# Patient Record
Sex: MALE | Race: BLACK OR AFRICAN AMERICAN | Employment: OTHER | URBAN - METROPOLITAN AREA
[De-identification: names, ages, dates, MRNs, and addresses within clinical notes are randomized per-mention and may not be internally consistent; named-entity substitution may affect disease eponyms.]

---

## 2017-01-06 PROBLEM — M17.12 PRIMARY OSTEOARTHRITIS OF LEFT KNEE: Status: ACTIVE | Noted: 2017-01-06

## 2017-12-28 ENCOUNTER — TELEPHONE (OUTPATIENT)
Dept: INTERNAL MEDICINE CLINIC | Facility: CLINIC | Age: 47
End: 2017-12-28

## 2017-12-28 NOTE — TELEPHONE ENCOUNTER
Patient calling with R side chest pain off/on, denies any SOB, jaw pain or arm pain (not cardiac) .  Per patient has hx of  Acid reflux but not on any medication and does not recall what he has taken in the past. Patient states \"feels like food is stuck @

## 2017-12-29 ENCOUNTER — HOSPITAL ENCOUNTER (OUTPATIENT)
Dept: GENERAL RADIOLOGY | Age: 47
Discharge: HOME | End: 2017-12-29
Attending: INTERNAL MEDICINE
Payer: COMMERCIAL

## 2017-12-29 ENCOUNTER — APPOINTMENT (OUTPATIENT)
Dept: LAB | Age: 47
End: 2017-12-29
Attending: INTERNAL MEDICINE
Payer: COMMERCIAL

## 2017-12-29 ENCOUNTER — OFFICE VISIT (OUTPATIENT)
Dept: INTERNAL MEDICINE CLINIC | Facility: CLINIC | Age: 47
End: 2017-12-29

## 2017-12-29 VITALS
RESPIRATION RATE: 16 BRPM | HEIGHT: 75 IN | HEART RATE: 100 BPM | WEIGHT: 204 LBS | DIASTOLIC BLOOD PRESSURE: 82 MMHG | SYSTOLIC BLOOD PRESSURE: 120 MMHG | BODY MASS INDEX: 25.36 KG/M2 | OXYGEN SATURATION: 98 % | TEMPERATURE: 98 F

## 2017-12-29 DIAGNOSIS — K22.2 ESOPHAGEAL STRICTURE: ICD-10-CM

## 2017-12-29 DIAGNOSIS — R07.9 CHEST PAIN, UNSPECIFIED TYPE: ICD-10-CM

## 2017-12-29 DIAGNOSIS — R07.9 CHEST PAIN, UNSPECIFIED TYPE: Primary | ICD-10-CM

## 2017-12-29 PROCEDURE — 36415 COLL VENOUS BLD VENIPUNCTURE: CPT | Performed by: INTERNAL MEDICINE

## 2017-12-29 PROCEDURE — 83690 ASSAY OF LIPASE: CPT | Performed by: INTERNAL MEDICINE

## 2017-12-29 PROCEDURE — 99214 OFFICE O/P EST MOD 30 MIN: CPT | Performed by: INTERNAL MEDICINE

## 2017-12-29 PROCEDURE — 71020 XR CHEST PA + LAT CHEST (CPT=71020): CPT | Performed by: INTERNAL MEDICINE

## 2017-12-29 PROCEDURE — 85025 COMPLETE CBC W/AUTO DIFF WBC: CPT | Performed by: INTERNAL MEDICINE

## 2017-12-29 PROCEDURE — 93000 ELECTROCARDIOGRAM COMPLETE: CPT | Performed by: INTERNAL MEDICINE

## 2017-12-29 PROCEDURE — 81003 URINALYSIS AUTO W/O SCOPE: CPT | Performed by: INTERNAL MEDICINE

## 2017-12-29 PROCEDURE — 80053 COMPREHEN METABOLIC PANEL: CPT | Performed by: INTERNAL MEDICINE

## 2017-12-29 RX ORDER — PANTOPRAZOLE SODIUM 40 MG/1
40 TABLET, DELAYED RELEASE ORAL
Qty: 30 TABLET | Refills: 11 | Status: SHIPPED | OUTPATIENT
Start: 2017-12-29 | End: 2018-09-10

## 2017-12-29 NOTE — TELEPHONE ENCOUNTER
Cannot really give a definite answer as to the cause of his symptoms he could try over-the-counter medicines if he so desires may be omeprazole 20 mg

## 2017-12-29 NOTE — PROGRESS NOTES
Greyson Lam   50year old male. Patient presents with:  Chest Pain      HPI:     Chest Pain:   The patient is complaining of chest pain , no symptoms presently . The chest pain began 6-8 weeks ago  The chest pain is described as sharp . swallowing none. Weight loss none. EXAM:   /82   Pulse 100   Temp 98.3 °F (36.8 °C) (Oral)   Resp 16   Ht 75\"   Wt 204 lb   SpO2 98%   BMI 25.50 kg/m²     Problem focused exam:   General Appearance: alert and oriented , in no acute distress .

## 2018-01-23 ENCOUNTER — OFFICE VISIT (OUTPATIENT)
Dept: INTERNAL MEDICINE CLINIC | Facility: CLINIC | Age: 48
End: 2018-01-23

## 2018-01-23 VITALS
HEIGHT: 74.5 IN | BODY MASS INDEX: 25.9 KG/M2 | OXYGEN SATURATION: 96 % | WEIGHT: 204 LBS | DIASTOLIC BLOOD PRESSURE: 70 MMHG | RESPIRATION RATE: 16 BRPM | HEART RATE: 90 BPM | TEMPERATURE: 99 F | SYSTOLIC BLOOD PRESSURE: 110 MMHG

## 2018-01-23 DIAGNOSIS — Z00.00 ROUTINE GENERAL MEDICAL EXAMINATION AT A HEALTH CARE FACILITY: Primary | ICD-10-CM

## 2018-01-23 PROCEDURE — 99396 PREV VISIT EST AGE 40-64: CPT | Performed by: INTERNAL MEDICINE

## 2018-01-23 NOTE — PROGRESS NOTES
Jerrica Horne  1970 is a 46year old male.   Patient presents with:  Physical      HPI:   No new cc    Current Outpatient Prescriptions:  Pantoprazole Sodium 40 MG Oral Tab EC Take 1 tablet (40 mg total) by mouth every morning before breakfast. Ambreen Mckeon with erection, penile discharge, testicular pain or swelling, urgency/frequency.    Genitourinary:   Patient denies blood in urine, burning on urination, difficulty urinating, dysuria, urinary frequency , urinary incontinence/no history of kidney disease or discharge, no penile lesions. Testicles: unremarkable, normal-size, without masses, non tender. EXTREMITIES:   Clubbing: none. Cyanosis: absent . Edema: none. Pulses: present.    Tremors: no.   Varicose veins: spider veins  MUSCULOSKELETAL:   Cerv

## 2018-04-26 ENCOUNTER — TELEPHONE (OUTPATIENT)
Dept: INTERNAL MEDICINE CLINIC | Facility: CLINIC | Age: 48
End: 2018-04-26

## 2018-04-26 DIAGNOSIS — E78.00 ELEVATED LDL CHOLESTEROL LEVEL: Primary | ICD-10-CM

## 2018-04-26 DIAGNOSIS — R73.09 ELEVATED HEMOGLOBIN A1C: ICD-10-CM

## 2018-04-26 NOTE — TELEPHONE ENCOUNTER
----- Message from Smiley Mims MD sent at 4/19/2018 12:14 PM CDT -----  Reviewed results   LDL is borderline high. Sugars are drifting higher.   For his diet and exercise -recheck lipid and hemoglobin A1c in 4 months

## 2018-05-31 PROCEDURE — 88305 TISSUE EXAM BY PATHOLOGIST: CPT | Performed by: INTERNAL MEDICINE

## 2018-08-31 ENCOUNTER — TELEPHONE (OUTPATIENT)
Dept: INTERNAL MEDICINE CLINIC | Facility: CLINIC | Age: 48
End: 2018-08-31

## 2018-08-31 NOTE — TELEPHONE ENCOUNTER
Received a call from [facility] with [facility] who also had pt on the line. Pt had previously called our office requesting a SDA with Dr. Kevon Vanegas. None available d/t Dr. Kevon Vanegas being out of the office today.  Pt stated he called [employer] to see where he could go

## 2018-09-01 ENCOUNTER — APPOINTMENT (OUTPATIENT)
Dept: GENERAL RADIOLOGY | Facility: HOSPITAL | Age: 48
End: 2018-09-01
Payer: COMMERCIAL

## 2018-09-01 ENCOUNTER — HOSPITAL ENCOUNTER (EMERGENCY)
Facility: HOSPITAL | Age: 48
Discharge: HOME | End: 2018-09-02
Payer: COMMERCIAL

## 2018-09-01 DIAGNOSIS — R07.89 CHEST PAIN, ATYPICAL: Primary | ICD-10-CM

## 2018-09-01 LAB
ALBUMIN SERPL-MCNC: 3.4 G/DL (ref 3.5–4.8)
ALBUMIN/GLOB SERPL: 0.8 {RATIO} (ref 1–2)
ALP LIVER SERPL-CCNC: 69 U/L (ref 45–117)
ALT SERPL-CCNC: 25 U/L (ref 17–63)
ANION GAP SERPL CALC-SCNC: 5 MMOL/L (ref 0–18)
APTT PPP: 26.2 SECONDS (ref 26.1–34.6)
AST SERPL-CCNC: 13 U/L (ref 15–41)
BASOPHILS # BLD AUTO: 0.05 X10(3) UL (ref 0–0.1)
BASOPHILS NFR BLD AUTO: 0.8 %
BILIRUB SERPL-MCNC: 0.3 MG/DL (ref 0.1–2)
BUN BLD-MCNC: 15 MG/DL (ref 8–20)
BUN/CREAT SERPL: 13.6 (ref 10–20)
CALCIUM BLD-MCNC: 8.5 MG/DL (ref 8.3–10.3)
CHLORIDE SERPL-SCNC: 106 MMOL/L (ref 101–111)
CO2 SERPL-SCNC: 29 MMOL/L (ref 22–32)
CREAT BLD-MCNC: 1.1 MG/DL (ref 0.7–1.3)
D-DIMER: <0.27 UG/ML FEU (ref 0–0.49)
EOSINOPHIL # BLD AUTO: 0.34 X10(3) UL (ref 0–0.3)
EOSINOPHIL NFR BLD AUTO: 5.7 %
ERYTHROCYTE [DISTWIDTH] IN BLOOD BY AUTOMATED COUNT: 13.6 % (ref 11.5–16)
GLOBULIN PLAS-MCNC: 4.1 G/DL (ref 2.5–4)
GLUCOSE BLD-MCNC: 111 MG/DL (ref 70–99)
HCT VFR BLD AUTO: 41.8 % (ref 37–53)
HGB BLD-MCNC: 13.8 G/DL (ref 13–17)
IMMATURE GRANULOCYTE COUNT: 0.01 X10(3) UL (ref 0–1)
IMMATURE GRANULOCYTE RATIO %: 0.2 %
INR BLD: 0.98 (ref 0.9–1.1)
LIPASE: 138 U/L (ref 73–393)
LYMPHOCYTES # BLD AUTO: 2.46 X10(3) UL (ref 0.9–4)
LYMPHOCYTES NFR BLD AUTO: 41.1 %
M PROTEIN MFR SERPL ELPH: 7.5 G/DL (ref 6.1–8.3)
MCH RBC QN AUTO: 30.8 PG (ref 27–33.2)
MCHC RBC AUTO-ENTMCNC: 33 G/DL (ref 31–37)
MCV RBC AUTO: 93.3 FL (ref 80–99)
MONOCYTES # BLD AUTO: 0.44 X10(3) UL (ref 0.1–1)
MONOCYTES NFR BLD AUTO: 7.4 %
NEUTROPHIL ABS PRELIM: 2.68 X10 (3) UL (ref 1.3–6.7)
NEUTROPHILS # BLD AUTO: 2.68 X10(3) UL (ref 1.3–6.7)
NEUTROPHILS NFR BLD AUTO: 44.8 %
OSMOLALITY SERPL CALC.SUM OF ELEC: 292 MOSM/KG (ref 275–295)
PLATELET # BLD AUTO: 176 10(3)UL (ref 150–450)
POTASSIUM SERPL-SCNC: 3.9 MMOL/L (ref 3.6–5.1)
PSA SERPL DL<=0.01 NG/ML-MCNC: 13.4 SECONDS (ref 12.4–14.7)
RBC # BLD AUTO: 4.48 X10(6)UL (ref 4.3–5.7)
RED CELL DISTRIBUTION WIDTH-SD: 46.3 FL (ref 35.1–46.3)
SODIUM SERPL-SCNC: 140 MMOL/L (ref 136–144)
TROPONIN I SERPL-MCNC: <0.046 NG/ML (ref ?–0.05)
WBC # BLD AUTO: 6 X10(3) UL (ref 4–13)

## 2018-09-01 PROCEDURE — 93005 ELECTROCARDIOGRAM TRACING: CPT

## 2018-09-01 PROCEDURE — 99285 EMERGENCY DEPT VISIT HI MDM: CPT

## 2018-09-01 PROCEDURE — 93010 ELECTROCARDIOGRAM REPORT: CPT

## 2018-09-01 PROCEDURE — 71045 X-RAY EXAM CHEST 1 VIEW: CPT

## 2018-09-01 PROCEDURE — 85730 THROMBOPLASTIN TIME PARTIAL: CPT

## 2018-09-01 PROCEDURE — 85610 PROTHROMBIN TIME: CPT

## 2018-09-01 PROCEDURE — 83690 ASSAY OF LIPASE: CPT

## 2018-09-01 PROCEDURE — 85025 COMPLETE CBC W/AUTO DIFF WBC: CPT

## 2018-09-01 PROCEDURE — 96374 THER/PROPH/DIAG INJ IV PUSH: CPT

## 2018-09-01 PROCEDURE — 80053 COMPREHEN METABOLIC PANEL: CPT

## 2018-09-01 PROCEDURE — 84484 ASSAY OF TROPONIN QUANT: CPT

## 2018-09-01 PROCEDURE — 85378 FIBRIN DEGRADE SEMIQUANT: CPT

## 2018-09-01 RX ORDER — MAGNESIUM HYDROXIDE/ALUMINUM HYDROXICE/SIMETHICONE 120; 1200; 1200 MG/30ML; MG/30ML; MG/30ML
30 SUSPENSION ORAL ONCE
Status: COMPLETED | OUTPATIENT
Start: 2018-09-01 | End: 2018-09-01

## 2018-09-01 RX ORDER — KETOROLAC TROMETHAMINE 30 MG/ML
10 INJECTION, SOLUTION INTRAMUSCULAR; INTRAVENOUS ONCE
Status: COMPLETED | OUTPATIENT
Start: 2018-09-01 | End: 2018-09-01

## 2018-09-02 VITALS
HEART RATE: 63 BPM | SYSTOLIC BLOOD PRESSURE: 123 MMHG | HEIGHT: 76 IN | OXYGEN SATURATION: 100 % | WEIGHT: 200 LBS | DIASTOLIC BLOOD PRESSURE: 76 MMHG | BODY MASS INDEX: 24.36 KG/M2 | RESPIRATION RATE: 16 BRPM

## 2018-09-02 LAB — TROPONIN I SERPL-MCNC: <0.046 NG/ML (ref ?–0.05)

## 2018-09-02 PROCEDURE — 84484 ASSAY OF TROPONIN QUANT: CPT

## 2018-09-02 NOTE — ED PROVIDER NOTES
Patient Seen in: [facility] Emergency Department    History   Patient presents with:  Chest Pain Angina (cardiovascular)    Stated Complaint: chest pain    HPI    This pleasant 76-year-old male presents to the ER with the complaint that for weeks he 119/82  Pulse: 75  Resp: 18  Temp: n/a  Temp src: n/a  SpO2: 96 %  O2 Device: None (Room air)    Current:/69   Pulse 62   Resp 12   Ht 193 cm (6' 4\")   Wt 90.7 kg   SpO2 97%   BMI 24.34 kg/m²         Physical Exam    GENERAL APPEARANCE:     Well dev Narrative: The following orders were created for panel order CBC WITH DIFFERENTIAL WITH PLATELET.   Procedure                               Abnormality         Status                     ---------                               -----------         --- primary care physician for reexamination further outpatient workup. I gave him a copy of the results. His wife is driving home. MDM       Patient was screened and evaluated during this visit.    As a treating physician attending to the patient, I deter

## 2018-09-03 LAB
ATRIAL RATE: 65 BPM
ATRIAL RATE: 70 BPM
P AXIS: 55 DEGREES
P AXIS: 70 DEGREES
P-R INTERVAL: 222 MS
P-R INTERVAL: 234 MS
Q-T INTERVAL: 356 MS
Q-T INTERVAL: 374 MS
QRS DURATION: 82 MS
QRS DURATION: 82 MS
QTC CALCULATION (BEZET): 384 MS
QTC CALCULATION (BEZET): 388 MS
R AXIS: 24 DEGREES
R AXIS: 26 DEGREES
T AXIS: 14 DEGREES
T AXIS: 6 DEGREES
VENTRICULAR RATE: 65 BPM
VENTRICULAR RATE: 70 BPM

## 2018-09-10 ENCOUNTER — OFFICE VISIT (OUTPATIENT)
Dept: INTERNAL MEDICINE CLINIC | Facility: CLINIC | Age: 48
End: 2018-09-10

## 2018-09-10 VITALS
DIASTOLIC BLOOD PRESSURE: 70 MMHG | RESPIRATION RATE: 12 BRPM | SYSTOLIC BLOOD PRESSURE: 110 MMHG | OXYGEN SATURATION: 97 % | BODY MASS INDEX: 24 KG/M2 | WEIGHT: 198.75 LBS | TEMPERATURE: 98 F | HEART RATE: 84 BPM

## 2018-09-10 DIAGNOSIS — R07.89 OTHER CHEST PAIN: Primary | ICD-10-CM

## 2018-09-10 PROCEDURE — 99214 OFFICE O/P EST MOD 30 MIN: CPT | Performed by: INTERNAL MEDICINE

## 2018-09-10 NOTE — PATIENT INSTRUCTIONS
Pt concerned about the heart. Will proceed with tests as outlined. Patient informed this could be related to the esophageal stricture.   May go to ER if symptoms signs worsen

## 2018-09-10 NOTE — PROGRESS NOTES
Isac Lynn   46year old male. No chief complaint on file. HPI:     Chest Pain:    Follow-up chest pain. Was in the ER. Recently. Has had no other chest pain at this moment. No GI symptoms.     No current outpatient medications on lymphadenopathy . Chest: normal shape and expansion with respiration . Heart: normal rate , regular rhythm , no murmurs . Lungs: respiratory rate is normal, but breath sounds are clear bilaterally .    Extremities: no clubbing, cyanosis or edema , per

## 2018-09-19 ENCOUNTER — HOSPITAL ENCOUNTER (OUTPATIENT)
Dept: CV DIAGNOSTICS | Facility: HOSPITAL | Age: 48
Discharge: HOME | End: 2018-09-19
Attending: INTERNAL MEDICINE
Payer: COMMERCIAL

## 2018-09-19 DIAGNOSIS — R07.89 OTHER CHEST PAIN: ICD-10-CM

## 2018-09-19 PROCEDURE — 93017 CV STRESS TEST TRACING ONLY: CPT | Performed by: INTERNAL MEDICINE

## 2018-09-19 PROCEDURE — 93018 CV STRESS TEST I&R ONLY: CPT | Performed by: INTERNAL MEDICINE

## 2018-09-24 ENCOUNTER — HOSPITAL ENCOUNTER (OUTPATIENT)
Dept: CT IMAGING | Facility: HOSPITAL | Age: 48
Discharge: HOME | End: 2018-09-24
Attending: INTERNAL MEDICINE

## 2018-09-24 DIAGNOSIS — Z13.6 SCREENING FOR CARDIOVASCULAR CONDITION: ICD-10-CM

## 2018-09-24 DIAGNOSIS — R07.89 OTHER CHEST PAIN: ICD-10-CM

## 2018-10-01 ENCOUNTER — OFFICE VISIT (OUTPATIENT)
Dept: INTERNAL MEDICINE CLINIC | Facility: CLINIC | Age: 48
End: 2018-10-01

## 2018-10-01 VITALS
TEMPERATURE: 98 F | HEIGHT: 75 IN | BODY MASS INDEX: 24.93 KG/M2 | WEIGHT: 200.5 LBS | OXYGEN SATURATION: 97 % | HEART RATE: 88 BPM | RESPIRATION RATE: 18 BRPM | DIASTOLIC BLOOD PRESSURE: 60 MMHG | SYSTOLIC BLOOD PRESSURE: 100 MMHG

## 2018-10-01 DIAGNOSIS — L03.115 CELLULITIS OF RIGHT LEG: Primary | ICD-10-CM

## 2018-10-01 PROCEDURE — 99213 OFFICE O/P EST LOW 20 MIN: CPT | Performed by: INTERNAL MEDICINE

## 2018-10-01 RX ORDER — TRIAMCINOLONE ACETONIDE 0.25 MG/G
1 CREAM TOPICAL 2 TIMES DAILY
Qty: 80 G | Refills: 0 | Status: SHIPPED | OUTPATIENT
Start: 2018-10-01 | End: 2019-05-13 | Stop reason: ALTCHOICE

## 2018-10-01 RX ORDER — SULFAMETHOXAZOLE AND TRIMETHOPRIM 800; 160 MG/1; MG/1
1 TABLET ORAL 2 TIMES DAILY
Qty: 20 TABLET | Refills: 0 | Status: SHIPPED | OUTPATIENT
Start: 2018-10-01 | End: 2018-10-11

## 2018-10-01 NOTE — PROGRESS NOTES
Mark Collazo is a 46year old male. HPI:   Patient presents with: Insect Bite: Right upper leg. Patient presents with acute dermatological complaint. First noticed itching last week.   He thought it was his new underwear at first.  However, over th (36.6 °C) (Oral)   Resp 18   Ht 75\"   Wt 200 lb 8 oz   SpO2 97%   BMI 25.06 kg/m²   GENERAL: Alert and oriented, well developed, well nourished,in no apparent distress  SKIN: 3 maculopapular lesions right lower thigh, tender to palpation  HEENT: atraumati

## 2018-10-01 NOTE — PATIENT INSTRUCTIONS
- Start antibiotic (Bactrim). Take 1 tablet twice daily with food for next 10 days. - Use steroid cream (triamcinolone) twice daily on red spots  - Keep an eye on your upper leg/thigh pain.   This may be from the infection spreading.    - Follow up in 1

## 2018-10-03 ENCOUNTER — TELEPHONE (OUTPATIENT)
Dept: INTERNAL MEDICINE CLINIC | Facility: CLINIC | Age: 48
End: 2018-10-03

## 2018-10-03 DIAGNOSIS — M79.604 RIGHT LEG PAIN: Primary | ICD-10-CM

## 2018-10-03 DIAGNOSIS — L53.9 ERYTHEMA OF SKIN: ICD-10-CM

## 2018-10-03 NOTE — TELEPHONE ENCOUNTER
Patient calling in, sated he had seen Dr Chasidy Bautista on 10/01/18 and his leg is still feeling uncomfortable. Symptoms of tenderness. Pt wondering if she should schedule another appointment to come in or have additional testing complete.

## 2018-10-03 NOTE — TELEPHONE ENCOUNTER
We can check an ultrasound of the leg (MSK, not doppler). Order in system. He can call central scheduling [phone] to schedule. If ultrasound is abnormal or symptoms persist can follow up with Dr. Warren Vickers in the office.

## 2018-10-03 NOTE — TELEPHONE ENCOUNTER
Pt with slight improvement of right leg. Leg remains red, warm to touch and tender from right knee to right hip. Please advise.

## 2018-10-09 ENCOUNTER — HOSPITAL ENCOUNTER (OUTPATIENT)
Dept: ULTRASOUND IMAGING | Facility: HOSPITAL | Age: 48
Discharge: HOME | End: 2018-10-09
Attending: INTERNAL MEDICINE
Payer: COMMERCIAL

## 2018-10-09 ENCOUNTER — HOSPITAL ENCOUNTER (OUTPATIENT)
Dept: CARDIOLOGY CLINIC | Facility: HOSPITAL | Age: 48
Discharge: HOME | End: 2018-10-09
Attending: INTERNAL MEDICINE

## 2018-10-09 DIAGNOSIS — M79.604 RIGHT LEG PAIN: ICD-10-CM

## 2018-10-09 DIAGNOSIS — Z13.9 ENCOUNTER FOR SCREENING: ICD-10-CM

## 2018-10-09 DIAGNOSIS — L53.9 ERYTHEMA OF SKIN: ICD-10-CM

## 2018-10-09 PROCEDURE — 76882 US LMTD JT/FCL EVL NVASC XTR: CPT | Performed by: INTERNAL MEDICINE

## 2018-10-23 ENCOUNTER — TELEPHONE (OUTPATIENT)
Dept: INTERNAL MEDICINE CLINIC | Facility: CLINIC | Age: 48
End: 2018-10-23

## 2018-10-23 DIAGNOSIS — I77.9 ARTERIAL DISEASE (HCC): Primary | ICD-10-CM

## 2018-10-23 NOTE — TELEPHONE ENCOUNTER
Notes recorded by Mei Vazquez MD on 10/11/2018 at 9:49 AM CDT  Reviewed results   Please proceed with carotid ultrasound- diagnosis abnormal arterial disease  See me after that    Pt notified of results and provider instructions.   Pt verbalized underst

## 2018-11-12 ENCOUNTER — HOSPITAL ENCOUNTER (OUTPATIENT)
Dept: ULTRASOUND IMAGING | Age: 48
Discharge: HOME | End: 2018-11-12
Attending: INTERNAL MEDICINE
Payer: COMMERCIAL

## 2018-11-12 DIAGNOSIS — I77.9 ARTERIAL DISEASE (HCC): ICD-10-CM

## 2018-11-12 PROCEDURE — 93880 EXTRACRANIAL BILAT STUDY: CPT | Performed by: INTERNAL MEDICINE

## 2019-05-13 ENCOUNTER — HOSPITAL ENCOUNTER (OUTPATIENT)
Dept: GENERAL RADIOLOGY | Age: 49
Discharge: HOME | End: 2019-05-13
Attending: INTERNAL MEDICINE
Payer: COMMERCIAL

## 2019-05-13 ENCOUNTER — OFFICE VISIT (OUTPATIENT)
Dept: INTERNAL MEDICINE CLINIC | Facility: CLINIC | Age: 49
End: 2019-05-13
Payer: COMMERCIAL

## 2019-05-13 VITALS
SYSTOLIC BLOOD PRESSURE: 100 MMHG | WEIGHT: 203 LBS | RESPIRATION RATE: 16 BRPM | HEIGHT: 74.5 IN | BODY MASS INDEX: 25.78 KG/M2 | DIASTOLIC BLOOD PRESSURE: 68 MMHG | HEART RATE: 88 BPM | TEMPERATURE: 99 F

## 2019-05-13 DIAGNOSIS — R05.3 PERSISTENT COUGH: ICD-10-CM

## 2019-05-13 DIAGNOSIS — K22.2 ESOPHAGEAL STRICTURE: Primary | ICD-10-CM

## 2019-05-13 DIAGNOSIS — K22.2 ESOPHAGEAL STRICTURE: ICD-10-CM

## 2019-05-13 DIAGNOSIS — Z00.00 PREVENTATIVE HEALTH CARE: ICD-10-CM

## 2019-05-13 DIAGNOSIS — R07.89 ATYPICAL CHEST PAIN: ICD-10-CM

## 2019-05-13 DIAGNOSIS — R73.03 PREDIABETES: ICD-10-CM

## 2019-05-13 PROCEDURE — 99214 OFFICE O/P EST MOD 30 MIN: CPT | Performed by: INTERNAL MEDICINE

## 2019-05-13 PROCEDURE — 71046 X-RAY EXAM CHEST 2 VIEWS: CPT | Performed by: INTERNAL MEDICINE

## 2019-05-13 NOTE — PATIENT INSTRUCTIONS
- We will check a chest x-ray today  - Schedule appointment with GI specialist (Dr. Patrizia Lazar)  - We will check your general screening blood tests. Get them done at [facility]. It was a pleasure seeing you in the clinic today.   Thank you for choosing the

## 2019-05-13 NOTE — PROGRESS NOTES
Javy Hubbard is a 44year old male. HPI:   Patient presents with:  Cough  Chest Pain    Patient presents with several acute complaints. He has been dealing with persistent cough, chest discomfort/pain.   This has been an intermittent issue, going on s lb  01/29/18 : 206 lb  01/23/18 : 204 lb    EXAM:   /68 (BP Location: Left arm, Patient Position: Sitting, Cuff Size: adult)   Pulse 88   Temp 98.6 °F (37 °C) (Oral)   Resp 16   Ht 74.5\"   Wt 203 lb   BMI 25.71 kg/m²   GENERAL: Alert and oriented, w understanding of these issues and agrees to the plan. The patient is asked to return to clinic in 1-2 months with Dr. Mary Tripp MD for follow up on chronic issues, or earlier if acute issues arise.     Kali Umanzor MD

## 2019-05-21 DIAGNOSIS — R73.03 PREDIABETES: Primary | ICD-10-CM

## 2019-05-21 DIAGNOSIS — R73.09 ELEVATED HEMOGLOBIN A1C: ICD-10-CM

## 2019-05-22 PROBLEM — K20.0 EOSINOPHILIC ESOPHAGITIS: Status: ACTIVE | Noted: 2019-05-22

## 2019-06-07 ENCOUNTER — HOSPITAL ENCOUNTER (OUTPATIENT)
Dept: GENERAL RADIOLOGY | Facility: HOSPITAL | Age: 49
Discharge: HOME | End: 2019-06-07
Attending: INTERNAL MEDICINE
Payer: COMMERCIAL

## 2019-06-07 DIAGNOSIS — R13.19 ESOPHAGEAL DYSPHAGIA: ICD-10-CM

## 2019-06-07 DIAGNOSIS — R07.9 CHEST PAIN, UNSPECIFIED TYPE: ICD-10-CM

## 2019-06-07 DIAGNOSIS — K20.0 EOSINOPHILIC ESOPHAGITIS: ICD-10-CM

## 2019-06-07 PROCEDURE — 74220 X-RAY XM ESOPHAGUS 1CNTRST: CPT | Performed by: INTERNAL MEDICINE

## 2019-06-20 ENCOUNTER — HOSPITAL ENCOUNTER (EMERGENCY)
Facility: HOSPITAL | Age: 49
Discharge: HOME | End: 2019-06-20
Attending: EMERGENCY MEDICINE
Payer: COMMERCIAL

## 2019-06-20 ENCOUNTER — TELEPHONE (OUTPATIENT)
Dept: INTERNAL MEDICINE CLINIC | Facility: CLINIC | Age: 49
End: 2019-06-20

## 2019-06-20 ENCOUNTER — APPOINTMENT (OUTPATIENT)
Dept: GENERAL RADIOLOGY | Facility: HOSPITAL | Age: 49
End: 2019-06-20
Attending: EMERGENCY MEDICINE
Payer: COMMERCIAL

## 2019-06-20 VITALS
RESPIRATION RATE: 15 BRPM | SYSTOLIC BLOOD PRESSURE: 103 MMHG | BODY MASS INDEX: 24.87 KG/M2 | OXYGEN SATURATION: 98 % | WEIGHT: 200 LBS | HEART RATE: 67 BPM | HEIGHT: 75 IN | TEMPERATURE: 97 F | DIASTOLIC BLOOD PRESSURE: 67 MMHG

## 2019-06-20 DIAGNOSIS — R07.89 CHEST PAIN, MUSCULOSKELETAL: Primary | ICD-10-CM

## 2019-06-20 PROCEDURE — 99285 EMERGENCY DEPT VISIT HI MDM: CPT

## 2019-06-20 PROCEDURE — 96374 THER/PROPH/DIAG INJ IV PUSH: CPT

## 2019-06-20 PROCEDURE — 93010 ELECTROCARDIOGRAM REPORT: CPT

## 2019-06-20 PROCEDURE — 80053 COMPREHEN METABOLIC PANEL: CPT | Performed by: EMERGENCY MEDICINE

## 2019-06-20 PROCEDURE — 84484 ASSAY OF TROPONIN QUANT: CPT | Performed by: EMERGENCY MEDICINE

## 2019-06-20 PROCEDURE — 71046 X-RAY EXAM CHEST 2 VIEWS: CPT | Performed by: EMERGENCY MEDICINE

## 2019-06-20 PROCEDURE — 85025 COMPLETE CBC W/AUTO DIFF WBC: CPT | Performed by: EMERGENCY MEDICINE

## 2019-06-20 PROCEDURE — 93005 ELECTROCARDIOGRAM TRACING: CPT

## 2019-06-20 RX ORDER — KETOROLAC TROMETHAMINE 30 MG/ML
30 INJECTION, SOLUTION INTRAMUSCULAR; INTRAVENOUS ONCE
Status: COMPLETED | OUTPATIENT
Start: 2019-06-20 | End: 2019-06-20

## 2019-06-20 NOTE — ED PROVIDER NOTES
Patient Seen in: [facility] Emergency Department    History   Patient presents with:  Chest Pain Angina (cardiovascular)    Stated Complaint: chest pain    HPI    68-year-old male presents with 1 week of chest pain.   He reports 5 out of 10 sharp aidan clear   Heart: regular rate rhythm and no murmur. Abdomen: Soft and nontender. No abdominal masses. No peritoneal signs   Extremities: no edema, normal peripheral pulses  MSK:There is tenderness on palpation of the right upper pectoral region.   There by Technologist)  Patient has right sided and central chest pain that radiates posterior and a cough for a couple weeks. FINDINGS:  LUNGS:  No focal consolidation. Normal vascularity. CARDIAC:  Normal size cardiac silhouette.  MEDIASTINUM:  Normal. PLEU coughing, bending and twisting. It is reproducible on palpation. He has no shortness of breath or palpitations. EKG is unremarkable. Labs and chest x-ray ordered. Toradol ordered for pain. No acute findings on laboratory evaluation or chest x-ray.

## 2019-06-20 NOTE — TELEPHONE ENCOUNTER
Pt stated the past 3-4 days he has been experiencing chest pain. Pt reports no shortness of breath but feels like he is breathing differently. Pt denies headache, vision changes, nausea/vomiting, difficulty swallowing.  Pt does state to have some upper back

## 2019-06-20 NOTE — ED INITIAL ASSESSMENT (HPI)
Patient arrives from home with c/o chest pain states pain started Thursday last week describes as continuous and sharp to right upper chest with radiation into back.

## 2019-06-26 ENCOUNTER — TELEPHONE (OUTPATIENT)
Dept: INTERNAL MEDICINE CLINIC | Facility: CLINIC | Age: 49
End: 2019-06-26

## 2019-06-26 ENCOUNTER — OFFICE VISIT (OUTPATIENT)
Dept: INTERNAL MEDICINE CLINIC | Facility: CLINIC | Age: 49
End: 2019-06-26
Payer: COMMERCIAL

## 2019-06-26 VITALS
HEART RATE: 80 BPM | HEIGHT: 75 IN | OXYGEN SATURATION: 98 % | BODY MASS INDEX: 24.8 KG/M2 | DIASTOLIC BLOOD PRESSURE: 70 MMHG | WEIGHT: 199.5 LBS | TEMPERATURE: 98 F | SYSTOLIC BLOOD PRESSURE: 104 MMHG | RESPIRATION RATE: 16 BRPM

## 2019-06-26 DIAGNOSIS — R07.89 RIGHT-SIDED CHEST WALL PAIN: Primary | ICD-10-CM

## 2019-06-26 DIAGNOSIS — M54.6 ACUTE RIGHT-SIDED THORACIC BACK PAIN: ICD-10-CM

## 2019-06-26 PROCEDURE — 99214 OFFICE O/P EST MOD 30 MIN: CPT | Performed by: PHYSICIAN ASSISTANT

## 2019-06-26 RX ORDER — CYCLOBENZAPRINE HCL 10 MG
10 TABLET ORAL NIGHTLY PRN
Qty: 10 TABLET | Refills: 0 | Status: SHIPPED | OUTPATIENT
Start: 2019-06-26 | End: 2020-09-24 | Stop reason: ALTCHOICE

## 2019-06-26 RX ORDER — MELOXICAM 15 MG/1
15 TABLET ORAL DAILY
Qty: 15 TABLET | Refills: 0 | Status: SHIPPED | OUTPATIENT
Start: 2019-06-26 | End: 2020-09-24

## 2019-06-26 NOTE — PROGRESS NOTES
Isac Lynn is a 44year old male. HPI:   Patient presents for f/u from recent ED visit. Seen for R sided chest pain which began about three weeks ago. Describes as a constant sharp/stabbing pain for the past few weeks.   R side of chest and R bandar pain, nausea, vomiting, diarrhea, constipation  NEURO: denies headaches  EXT: denies edema    EXAM:   /70 (BP Location: Left arm, Patient Position: Sitting, Cuff Size: adult)   Pulse 80   Temp 98 °F (36.7 °C) (Oral)   Resp 16   Ht 75\"   Wt 199 lb 8

## 2019-06-26 NOTE — TELEPHONE ENCOUNTER
Pt was seen in ER for chest pain on 6/20/19    All cardiac testing came back WNL and ER notes state s/s are musculoskeletal in nature.      Pt states after receiving toradol in ER pain decreased but returned next day and pain has been present/constant since

## 2019-06-26 NOTE — PATIENT INSTRUCTIONS
Chest & back pain:  - start meloxicam (anti-inflammatory) -- 1 tablet daily with food x 5-7 days, then may take 1 tab daily with food as needed    -- do not combine this with advil, motrin, ibuprofen, aleve, naproxen, excedrin, aspirin  - start cyclobenzap

## 2019-07-22 ENCOUNTER — HOSPITAL (OUTPATIENT)
Dept: OTHER | Age: 49
End: 2019-07-22
Attending: INTERNAL MEDICINE

## 2019-07-23 ENCOUNTER — HOSPITAL (OUTPATIENT)
Dept: OTHER | Age: 49
End: 2019-07-23

## 2019-08-18 ENCOUNTER — HOSPITAL (OUTPATIENT)
Dept: OTHER | Age: 49
End: 2019-08-18

## 2019-09-11 ENCOUNTER — HOSPITAL (OUTPATIENT)
Dept: OTHER | Age: 49
End: 2019-09-11
Attending: INTERNAL MEDICINE

## 2020-01-11 ENCOUNTER — APPOINTMENT (OUTPATIENT)
Dept: GENERAL RADIOLOGY | Facility: HOSPITAL | Age: 50
End: 2020-01-11
Attending: EMERGENCY MEDICINE
Payer: COMMERCIAL

## 2020-01-11 ENCOUNTER — HOSPITAL ENCOUNTER (EMERGENCY)
Facility: HOSPITAL | Age: 50
Discharge: HOME | End: 2020-01-11
Attending: EMERGENCY MEDICINE
Payer: COMMERCIAL

## 2020-01-11 VITALS
DIASTOLIC BLOOD PRESSURE: 93 MMHG | RESPIRATION RATE: 16 BRPM | TEMPERATURE: 98 F | OXYGEN SATURATION: 96 % | HEART RATE: 79 BPM | BODY MASS INDEX: 25 KG/M2 | SYSTOLIC BLOOD PRESSURE: 125 MMHG | WEIGHT: 199.5 LBS

## 2020-01-11 DIAGNOSIS — K22.4 ESOPHAGEAL SPASM: ICD-10-CM

## 2020-01-11 DIAGNOSIS — S86.012A ACHILLES RUPTURE, LEFT, INITIAL ENCOUNTER: Primary | ICD-10-CM

## 2020-01-11 LAB
ALBUMIN SERPL-MCNC: 3.4 G/DL (ref 3.4–5)
ALBUMIN/GLOB SERPL: 0.9 {RATIO} (ref 1–2)
ALP LIVER SERPL-CCNC: 65 U/L (ref 45–117)
ALT SERPL-CCNC: 24 U/L (ref 16–61)
ANION GAP SERPL CALC-SCNC: 2 MMOL/L (ref 0–18)
AST SERPL-CCNC: 14 U/L (ref 15–37)
ATRIAL RATE: 88 BPM
BASOPHILS # BLD AUTO: 0.04 X10(3) UL (ref 0–0.2)
BASOPHILS NFR BLD AUTO: 0.9 %
BILIRUB SERPL-MCNC: 0.4 MG/DL (ref 0.1–2)
BUN BLD-MCNC: 16 MG/DL (ref 7–18)
BUN/CREAT SERPL: 14.3 (ref 10–20)
CALCIUM BLD-MCNC: 8.7 MG/DL (ref 8.5–10.1)
CHLORIDE SERPL-SCNC: 108 MMOL/L (ref 98–112)
CO2 SERPL-SCNC: 30 MMOL/L (ref 21–32)
CREAT BLD-MCNC: 1.12 MG/DL (ref 0.7–1.3)
DEPRECATED RDW RBC AUTO: 48.4 FL (ref 35.1–46.3)
EOSINOPHIL # BLD AUTO: 0.07 X10(3) UL (ref 0–0.7)
EOSINOPHIL NFR BLD AUTO: 1.6 %
ERYTHROCYTE [DISTWIDTH] IN BLOOD BY AUTOMATED COUNT: 13.6 % (ref 11–15)
GLOBULIN PLAS-MCNC: 3.8 G/DL (ref 2.8–4.4)
GLUCOSE BLD-MCNC: 102 MG/DL (ref 70–99)
HCT VFR BLD AUTO: 42.8 % (ref 39–53)
HGB BLD-MCNC: 13.5 G/DL (ref 13–17.5)
IMM GRANULOCYTES # BLD AUTO: 0 X10(3) UL (ref 0–1)
IMM GRANULOCYTES NFR BLD: 0 %
LYMPHOCYTES # BLD AUTO: 1.26 X10(3) UL (ref 1–4)
LYMPHOCYTES NFR BLD AUTO: 28.3 %
M PROTEIN MFR SERPL ELPH: 7.2 G/DL (ref 6.4–8.2)
MCH RBC QN AUTO: 30.3 PG (ref 26–34)
MCHC RBC AUTO-ENTMCNC: 31.5 G/DL (ref 31–37)
MCV RBC AUTO: 96 FL (ref 80–100)
MONOCYTES # BLD AUTO: 0.28 X10(3) UL (ref 0.1–1)
MONOCYTES NFR BLD AUTO: 6.3 %
NEUTROPHILS # BLD AUTO: 2.8 X10 (3) UL (ref 1.5–7.7)
NEUTROPHILS # BLD AUTO: 2.8 X10(3) UL (ref 1.5–7.7)
NEUTROPHILS NFR BLD AUTO: 62.9 %
OSMOLALITY SERPL CALC.SUM OF ELEC: 291 MOSM/KG (ref 275–295)
P AXIS: 53 DEGREES
P-R INTERVAL: 188 MS
PLATELET # BLD AUTO: 184 10(3)UL (ref 150–450)
POTASSIUM SERPL-SCNC: 3.9 MMOL/L (ref 3.5–5.1)
Q-T INTERVAL: 322 MS
QRS DURATION: 80 MS
QTC CALCULATION (BEZET): 389 MS
R AXIS: 25 DEGREES
RBC # BLD AUTO: 4.46 X10(6)UL (ref 4.3–5.7)
SODIUM SERPL-SCNC: 140 MMOL/L (ref 136–145)
T AXIS: 11 DEGREES
TROPONIN I SERPL-MCNC: <0.045 NG/ML (ref ?–0.04)
VENTRICULAR RATE: 88 BPM
WBC # BLD AUTO: 4.5 X10(3) UL (ref 4–11)

## 2020-01-11 PROCEDURE — 36415 COLL VENOUS BLD VENIPUNCTURE: CPT | Performed by: EMERGENCY MEDICINE

## 2020-01-11 PROCEDURE — 85025 COMPLETE CBC W/AUTO DIFF WBC: CPT | Performed by: EMERGENCY MEDICINE

## 2020-01-11 PROCEDURE — 84484 ASSAY OF TROPONIN QUANT: CPT | Performed by: EMERGENCY MEDICINE

## 2020-01-11 PROCEDURE — 99284 EMERGENCY DEPT VISIT MOD MDM: CPT | Performed by: EMERGENCY MEDICINE

## 2020-01-11 PROCEDURE — 71045 X-RAY EXAM CHEST 1 VIEW: CPT | Performed by: EMERGENCY MEDICINE

## 2020-01-11 PROCEDURE — 93005 ELECTROCARDIOGRAM TRACING: CPT

## 2020-01-11 PROCEDURE — 93010 ELECTROCARDIOGRAM REPORT: CPT | Performed by: EMERGENCY MEDICINE

## 2020-01-11 PROCEDURE — 73610 X-RAY EXAM OF ANKLE: CPT | Performed by: EMERGENCY MEDICINE

## 2020-01-11 PROCEDURE — 80053 COMPREHEN METABOLIC PANEL: CPT | Performed by: EMERGENCY MEDICINE

## 2020-01-11 RX ORDER — MAGNESIUM HYDROXIDE/ALUMINUM HYDROXICE/SIMETHICONE 120; 1200; 1200 MG/30ML; MG/30ML; MG/30ML
30 SUSPENSION ORAL ONCE
Status: COMPLETED | OUTPATIENT
Start: 2020-01-11 | End: 2020-01-11

## 2020-01-11 RX ORDER — TRAMADOL HYDROCHLORIDE 50 MG/1
TABLET ORAL EVERY 6 HOURS PRN
Qty: 20 TABLET | Refills: 0 | Status: SHIPPED | OUTPATIENT
Start: 2020-01-11 | End: 2020-01-18

## 2020-01-11 RX ORDER — LIDOCAINE HYDROCHLORIDE 20 MG/ML
10 SOLUTION OROPHARYNGEAL ONCE
Status: COMPLETED | OUTPATIENT
Start: 2020-01-11 | End: 2020-01-11

## 2020-01-11 RX ORDER — OMEPRAZOLE 40 MG/1
40 CAPSULE, DELAYED RELEASE ORAL DAILY
Qty: 30 CAPSULE | Refills: 0 | Status: SHIPPED | OUTPATIENT
Start: 2020-01-11 | End: 2020-02-10

## 2020-01-11 NOTE — ED INITIAL ASSESSMENT (HPI)
Pt presents with pain behind R ankle radiating up r lower leg, pt injured this am while playing basketball, pt also c/o pain to upper mid chest and r mid shoulder blade starting 1 week ago intermittantly, pt seen here for same pain several months ago

## 2020-01-11 NOTE — ED PROVIDER NOTES
Patient Seen in: [facility] Emergency Department      History   Patient presents with:  Lower Extremity Injury    Stated Complaint: ankle pain    HPI    57-year-old male presents to the emerge department with pain to his Achilles area after playing systems are as noted in HPI. Constitutional and vital signs reviewed. All other systems reviewed and negative except as noted above.     Physical Exam     ED Triage Vitals [01/11/20 0916]   /89   Pulse 96   Resp 16   Temp 98.4 °F (36.9 °C)   Tem DIFFERENTIAL WITH PLATELET    Narrative: The following orders were created for panel order CBC WITH DIFFERENTIAL WITH PLATELET.   Procedure                               Abnormality         Status                     --------- or pleural effusion. Heart and pulmonary vessels appear stable, normal caliber. Mediastinal contours are smooth. No pneumothorax. CONCLUSION:  No evidence of active cardiopulmonary disease.     Dictated by: Alex Shore MD on 1/11/2020 at 10:29     A Oral Capsule Delayed Release  Take 1 capsule (40 mg total) by mouth daily. , Normal, Disp-30 capsule, R-0    traMADol HCl 50 MG Oral Tab  Take 1-2 tablets ( mg total) by mouth every 6 (six) hours as needed for Pain., Normal, Disp-20 tablet, R-0    !!

## 2020-01-13 ENCOUNTER — TELEPHONE (OUTPATIENT)
Dept: INTERNAL MEDICINE CLINIC | Facility: CLINIC | Age: 50
End: 2020-01-13

## 2020-01-13 DIAGNOSIS — S86.012A RUPTURE OF LEFT ACHILLES TENDON, INITIAL ENCOUNTER: Primary | ICD-10-CM

## 2020-01-13 NOTE — TELEPHONE ENCOUNTER
LOV with Dr. Hartman Backbone on 9/10/2018  LOV in office with LL on 6/26/2019 for ER f/u Chest pain. Pt was seen on 1/11/2020 in ER with dx of Achilles rupture, left, initial encounter and was referred to Dr. Mariya Alexandra.  Dr. Yossi Blood handles knees/hips referral pend

## 2020-01-13 NOTE — TELEPHONE ENCOUNTER
Patient called back and stated that he would like this referral approved ASAP for him to see an orthopedic.  I advised the patient that Dr. Kirby Arizmendi is out of the office today and that the nurse is working on getting the approval. Patient wants to know if Relda Fraction

## 2020-01-13 NOTE — TELEPHONE ENCOUNTER
Dr. Wyatt Dawn doesn't have any openings - pt seeing partner Dr. Isma Perez in [place] instead

## 2020-01-13 NOTE — TELEPHONE ENCOUNTER
Patient stated that he was recently in the emergency room for a torn achilles tendon. He wants to know if Dr. Mike Pan can place a referral for him to seen an orthopedic without seeing Dr. Mike Pan. Please advise.

## 2020-01-31 ENCOUNTER — TELEPHONE (OUTPATIENT)
Dept: INTERNAL MEDICINE CLINIC | Facility: CLINIC | Age: 50
End: 2020-01-31

## 2020-01-31 NOTE — TELEPHONE ENCOUNTER
Patient was given referral for [facility] physical therapy and they do not take patient's insurance.  Patient requesting instead to go to [facility] physical therapy in [facility]# [phone]

## 2020-01-31 NOTE — TELEPHONE ENCOUNTER
Order for PT should come from Ortho - can they not place through [facility]? Called pt to discuss and LVM to call office back.

## 2020-02-05 ENCOUNTER — TELEPHONE (OUTPATIENT)
Dept: INTERNAL MEDICINE CLINIC | Facility: CLINIC | Age: 50
End: 2020-02-05

## 2020-02-05 DIAGNOSIS — S86.012A RUPTURE OF LEFT ACHILLES TENDON, INITIAL ENCOUNTER: Primary | ICD-10-CM

## 2020-02-05 NOTE — TELEPHONE ENCOUNTER
Patient called requesting referral for [facility] physical therapy. Referral entered in the system for [facility] and is not in network with his insurance.

## 2020-02-05 NOTE — TELEPHONE ENCOUNTER
Spoke with pt and he stated the order has already been sent to [facility] by Dr. Mandeep Gonzales office. Advised we would send request to Dr. Max Rao and then would contact pt with an update. Pt verbalized understanding. Initial referral needed for billing purposes.  P

## 2020-02-06 ENCOUNTER — TELEPHONE (OUTPATIENT)
Dept: INTERNAL MEDICINE CLINIC | Facility: CLINIC | Age: 50
End: 2020-02-06

## 2020-05-11 ENCOUNTER — TELEPHONE (OUTPATIENT)
Dept: INTERNAL MEDICINE CLINIC | Facility: CLINIC | Age: 50
End: 2020-05-11

## 2020-05-11 NOTE — TELEPHONE ENCOUNTER
Incoming fax from [facility] requesting a updated referral   Placed in Dr. Sergio Otero

## 2020-05-15 NOTE — TELEPHONE ENCOUNTER
Patient is calling back to check on the status of his referral for PT. I advised the patient that the referral is still pending and we are currently working on it. Patient stated that the effective date needs to start on 05/05/2020 so his visit is covered.

## 2020-05-15 NOTE — TELEPHONE ENCOUNTER
Dr Gregorio Burnham, surgeons office already placed referral on 5/6/20. Referral #[id number] for PT still pending.

## 2020-06-11 ENCOUNTER — TELEPHONE (OUTPATIENT)
Dept: INTERNAL MEDICINE CLINIC | Facility: CLINIC | Age: 50
End: 2020-06-11

## 2020-06-11 DIAGNOSIS — S86.012A RUPTURE OF LEFT ACHILLES TENDON, INITIAL ENCOUNTER: Primary | ICD-10-CM

## 2020-06-11 NOTE — TELEPHONE ENCOUNTER
Dr. Rosa M Newton office called requesting referral to be placed for physical therapy through [facility] Patient has seen Dr. Erick Olvera with [facility] but physical therapy through [facility] is not covered with patient's insurance plan.      Silvia LEBLANC#[phone]  Ref# [id number]

## 2020-06-16 NOTE — TELEPHONE ENCOUNTER
Pt notified referral authorized however prior referral authorized in 2/2020 for 12 visits. Pt stated he only used a couple. Advised he should have more than the 5 authorized this time however if does run out of visits should contact insurance.  Pt verbalize

## 2020-09-24 NOTE — PROGRESS NOTES
Benjamin Vanegas   1970 is a 44year old male.   Patient presents with:  Referral        HPI:     Lonell Decant interest or pleasure in doing things No  Feeling down, depressed, or hopeless No  work anxiety noted-no other stresses -patient recently lost his PLATELET  -     URINALYSIS, ROUTINE  -     PSA, DIAGNOSTIC  -     VITAMIN D, 25-HYDROXY    Depression, unspecified depression type    Other orders  -     FLULAVAL INFLUENZA VACCINE QUAD PRESERVATIVE FREE 0.5 ML        Patient Instructions   Patient is refu

## 2020-10-06 ENCOUNTER — OFFICE VISIT (OUTPATIENT)
Dept: INTERNAL MEDICINE CLINIC | Facility: CLINIC | Age: 50
End: 2020-10-06
Payer: COMMERCIAL

## 2020-10-06 VITALS
DIASTOLIC BLOOD PRESSURE: 86 MMHG | RESPIRATION RATE: 16 BRPM | OXYGEN SATURATION: 97 % | TEMPERATURE: 98 F | HEART RATE: 77 BPM | BODY MASS INDEX: 24.12 KG/M2 | SYSTOLIC BLOOD PRESSURE: 120 MMHG | WEIGHT: 194 LBS | HEIGHT: 75 IN

## 2020-10-06 DIAGNOSIS — Z00.00 ROUTINE GENERAL MEDICAL EXAMINATION AT A HEALTH CARE FACILITY: Primary | ICD-10-CM

## 2020-10-06 PROCEDURE — 3074F SYST BP LT 130 MM HG: CPT | Performed by: INTERNAL MEDICINE

## 2020-10-06 PROCEDURE — 3008F BODY MASS INDEX DOCD: CPT | Performed by: INTERNAL MEDICINE

## 2020-10-06 PROCEDURE — 3079F DIAST BP 80-89 MM HG: CPT | Performed by: INTERNAL MEDICINE

## 2020-10-06 PROCEDURE — 99396 PREV VISIT EST AGE 40-64: CPT | Performed by: INTERNAL MEDICINE

## 2020-10-06 NOTE — PROGRESS NOTES
Jenn Dean  1970 is a 44year old male. Patient presents with:  Physical      HPI:   No new cc  No current outpatient medications on file.       Past Medical History:   Diagnosis Date   • Esophageal stricture    • Pes planus (flat feet) Patient denies blood in urine, burning on urination, difficulty urinating, dysuria, urinary frequency , urinary incontinence/no history of kidney disease or genital abnormalities. no Dysuria. Nocturia None.    Musculoskeletal:   Patient denies arthritis , Testicles: unremarkable, normal-size, without masses, non tender. EXTREMITIES:   Clubbing: none. Cyanosis: absent . Edema: none. Pulses: present.    Tremors: no.   Varicose veins: spider veins  MUSCULOSKELETAL:   Cervical spines: normal.   L-S spi

## 2020-10-08 PROBLEM — E55.9 VITAMIN D DEFICIENCY: Status: ACTIVE | Noted: 2020-10-08

## 2020-10-08 RX ORDER — CHOLECALCIFEROL (VITAMIN D3) 50 MCG
1 TABLET ORAL DAILY
Qty: 90 TABLET | Refills: 3 | Status: SHIPPED | OUTPATIENT
Start: 2020-10-08 | End: 2021-01-06

## 2021-01-22 ENCOUNTER — TELEPHONE (OUTPATIENT)
Dept: INTERNAL MEDICINE CLINIC | Facility: CLINIC | Age: 51
End: 2021-01-22

## 2021-04-27 ENCOUNTER — OFFICE VISIT (OUTPATIENT)
Dept: INTERNAL MEDICINE CLINIC | Facility: CLINIC | Age: 51
End: 2021-04-27
Payer: COMMERCIAL

## 2021-04-27 VITALS
DIASTOLIC BLOOD PRESSURE: 70 MMHG | HEIGHT: 75 IN | RESPIRATION RATE: 16 BRPM | BODY MASS INDEX: 24.05 KG/M2 | TEMPERATURE: 98 F | SYSTOLIC BLOOD PRESSURE: 104 MMHG | WEIGHT: 193.38 LBS | HEART RATE: 81 BPM | OXYGEN SATURATION: 99 %

## 2021-04-27 DIAGNOSIS — L72.9 SKIN CYST: ICD-10-CM

## 2021-04-27 DIAGNOSIS — G44.209 TENSION-TYPE HEADACHE, NOT INTRACTABLE, UNSPECIFIED CHRONICITY PATTERN: Primary | ICD-10-CM

## 2021-04-27 PROCEDURE — 3078F DIAST BP <80 MM HG: CPT | Performed by: INTERNAL MEDICINE

## 2021-04-27 PROCEDURE — 99214 OFFICE O/P EST MOD 30 MIN: CPT | Performed by: INTERNAL MEDICINE

## 2021-04-27 PROCEDURE — 3008F BODY MASS INDEX DOCD: CPT | Performed by: INTERNAL MEDICINE

## 2021-04-27 PROCEDURE — 3074F SYST BP LT 130 MM HG: CPT | Performed by: INTERNAL MEDICINE

## 2021-04-27 RX ORDER — CYCLOBENZAPRINE HCL 5 MG
5 TABLET ORAL NIGHTLY
Qty: 30 TABLET | Refills: 0 | Status: SHIPPED | OUTPATIENT
Start: 2021-04-27 | End: 2021-05-07

## 2021-04-27 NOTE — PROGRESS NOTES
Maurice Simon  1970 is a 46year old male. Patient presents with:  Moles      HPI:     General:   see scanned sheet-headache. Headache:   see scanned sheet.   Has a painful bump on the right buttock region longstanding  Current Outpatient Medi Romberg: negative. Gait: normal.   Involuntary Movements: no tremors seen . Kernigs Sign: absent. Mental Status: Alert & oriented x 3. Motor: normal strength bilaterally. Muscle Bulk: normal .   Plantars: downgoing bilaterally.    Reflexes: no

## 2021-05-04 ENCOUNTER — HOSPITAL ENCOUNTER (OUTPATIENT)
Dept: CT IMAGING | Age: 51
Discharge: HOME | End: 2021-05-04
Attending: INTERNAL MEDICINE
Payer: COMMERCIAL

## 2021-05-04 DIAGNOSIS — G44.209 TENSION-TYPE HEADACHE, NOT INTRACTABLE, UNSPECIFIED CHRONICITY PATTERN: ICD-10-CM

## 2021-05-04 PROCEDURE — 70450 CT HEAD/BRAIN W/O DYE: CPT | Performed by: INTERNAL MEDICINE

## 2022-01-12 ENCOUNTER — TELEPHONE (OUTPATIENT)
Dept: INTERNAL MEDICINE CLINIC | Facility: CLINIC | Age: 52
End: 2022-01-12

## 2022-01-12 NOTE — TELEPHONE ENCOUNTER
Signed as coverage for Dr. Rogelio Mccracken. Patient should be aware that this method is not as sensitive as colonoscopy for detecting colon cancer and colonoscopy is generally preferred.  However, if still wishes to proceed and has positive Cologuard test then he

## 2022-01-12 NOTE — TELEPHONE ENCOUNTER
Pt called requesting Cologuard stool test. Pt provided a CPT code of 90191    Pt states it is recommended with his age that he does the test. please place order if appropriate.

## 2022-01-12 NOTE — TELEPHONE ENCOUNTER
Cologuard order requisition filled out and placed in Dr. Meliton Stein in-basket for signature, if appropriate. Patient will need to come in to office to sign form as well for kit to be mailed to patient.

## 2022-01-12 NOTE — TELEPHONE ENCOUNTER
Patient came in to office to sign order. Order faxed to [employer] for [employer]. Fax confirmation was received. Copy of order sent to scan.

## 2022-01-12 NOTE — TELEPHONE ENCOUNTER
Spoke with patient regarding below. Patient states her will come in to office sometime today to complete form. Please notify Eda in triage when patient arrives.

## 2022-01-17 LAB — AMB EXT COLOGUARD RESULT: NEGATIVE

## 2022-01-27 NOTE — TELEPHONE ENCOUNTER
Colonoscopy completed 5/31/2018. Instructions to repeat in 10 years (5/31/2028). Cologuard test results from [facility] placed in Dr. Moon Dow in-box. Result updated in Epic under \"Labs\".

## 2022-01-31 ENCOUNTER — OFFICE VISIT (OUTPATIENT)
Dept: INTERNAL MEDICINE CLINIC | Facility: CLINIC | Age: 52
End: 2022-01-31
Payer: COMMERCIAL

## 2022-01-31 VITALS
DIASTOLIC BLOOD PRESSURE: 69 MMHG | OXYGEN SATURATION: 98 % | SYSTOLIC BLOOD PRESSURE: 99 MMHG | TEMPERATURE: 99 F | HEIGHT: 74.79 IN | RESPIRATION RATE: 16 BRPM | BODY MASS INDEX: 25.76 KG/M2 | WEIGHT: 205 LBS | HEART RATE: 83 BPM

## 2022-01-31 DIAGNOSIS — G89.29 CHRONIC LEFT-SIDED HEADACHES: ICD-10-CM

## 2022-01-31 DIAGNOSIS — Z00.00 ANNUAL PHYSICAL EXAM: Primary | ICD-10-CM

## 2022-01-31 DIAGNOSIS — E11.9 DIET-CONTROLLED TYPE 2 DIABETES MELLITUS (HCC): ICD-10-CM

## 2022-01-31 DIAGNOSIS — R51.9 CHRONIC LEFT-SIDED HEADACHES: ICD-10-CM

## 2022-01-31 PROCEDURE — 90472 IMMUNIZATION ADMIN EACH ADD: CPT | Performed by: PHYSICIAN ASSISTANT

## 2022-01-31 PROCEDURE — 99396 PREV VISIT EST AGE 40-64: CPT | Performed by: PHYSICIAN ASSISTANT

## 2022-01-31 PROCEDURE — 99213 OFFICE O/P EST LOW 20 MIN: CPT | Performed by: PHYSICIAN ASSISTANT

## 2022-01-31 PROCEDURE — 3078F DIAST BP <80 MM HG: CPT | Performed by: PHYSICIAN ASSISTANT

## 2022-01-31 PROCEDURE — 90471 IMMUNIZATION ADMIN: CPT | Performed by: PHYSICIAN ASSISTANT

## 2022-01-31 PROCEDURE — 90750 HZV VACC RECOMBINANT IM: CPT | Performed by: PHYSICIAN ASSISTANT

## 2022-01-31 PROCEDURE — 3074F SYST BP LT 130 MM HG: CPT | Performed by: PHYSICIAN ASSISTANT

## 2022-01-31 PROCEDURE — 90715 TDAP VACCINE 7 YRS/> IM: CPT | Performed by: PHYSICIAN ASSISTANT

## 2022-01-31 PROCEDURE — 3008F BODY MASS INDEX DOCD: CPT | Performed by: PHYSICIAN ASSISTANT

## 2022-01-31 NOTE — PATIENT INSTRUCTIONS
Fasting blood work at [address] (includes urine test). Follow up with neurology regarding headaches. Please see Dr. Syed Smallwood or one of his partners for a diabetic eye exam.    Nurse visit in 2 months for your 2nd Shingles vaccine.     Please focus on a d

## 2022-01-31 NOTE — PROGRESS NOTES
Tania Sexton is a 45year old male who presents for a complete physical exam.   HPI:   Pt here for wellness visit. C/o L sided HAs for the past 6+ months. Occurring most days of the week.   Describes intermittent sharp pains - worse when laying down on Ht 6' 2.79\" (1.9 m)   Wt 205 lb (93 kg)   SpO2 98%   BMI 25.77 kg/m²   GENERAL: A&O, well developed, well nourished, in no apparent distress  SKIN: no rashes, no suspicious lesions  HEENT: PERRLA, EOMI, conjunctiva clear  NECK: supple, no lymphadenopathy

## 2022-02-17 ENCOUNTER — TELEMEDICINE (OUTPATIENT)
Dept: NEUROLOGY | Facility: CLINIC | Age: 52
End: 2022-02-17
Payer: COMMERCIAL

## 2022-02-17 DIAGNOSIS — M54.2 NECK PAIN: ICD-10-CM

## 2022-02-17 DIAGNOSIS — M54.81 OCCIPITAL NEURALGIA OF LEFT SIDE: ICD-10-CM

## 2022-02-17 DIAGNOSIS — R51.9 OCCIPITAL HEADACHE: Primary | ICD-10-CM

## 2022-02-17 PROCEDURE — 99204 OFFICE O/P NEW MOD 45 MIN: CPT | Performed by: OTHER

## 2022-02-17 RX ORDER — CYCLOBENZAPRINE HCL 10 MG
10 TABLET ORAL NIGHTLY
Qty: 30 TABLET | Refills: 2 | Status: SHIPPED | OUTPATIENT
Start: 2022-02-17

## 2022-02-19 LAB
ALT: 23 U/L (ref 9–46)
AST: 16 U/L (ref 10–35)
BUN: 19 MG/DL (ref 7–25)
CALCIUM: 9.2 MG/DL (ref 8.6–10.3)
CARBON DIOXIDE: 31 MMOL/L (ref 20–32)
CHLORIDE: 102 MMOL/L (ref 98–110)
CHOL/HDLC RATIO: 3.6 (CALC)
CHOLESTEROL, TOTAL: 178 MG/DL
CREATININE, RANDOM URINE: 228 MG/DL (ref 20–320)
CREATININE: 1.07 MG/DL (ref 0.7–1.33)
EGFR IF AFRICN AM: 91 ML/MIN/1.73M2
EGFR IF NONAFRICN AM: 78 ML/MIN/1.73M2
GLUCOSE: 104 MG/DL (ref 65–99)
HDL CHOLESTEROL: 50 MG/DL
HEMOGLOBIN A1C: 6.7 % OF TOTAL HGB
LDL-CHOLESTEROL: 114 MG/DL (CALC)
MICROALBUMIN/CREATININE RATIO, RANDOM URINE: 5 MCG/MG CREAT
MICROALBUMIN: 1.1 MG/DL
NON-HDL CHOLESTEROL: 128 MG/DL (CALC)
POTASSIUM: 4.4 MMOL/L (ref 3.5–5.3)
PSA, TOTAL: 1.62 NG/ML
SIGNAL TO CUT-OFF: 0.02
SODIUM: 139 MMOL/L (ref 135–146)
TRIGLYCERIDES: 62 MG/DL

## 2022-02-21 RX ORDER — ATORVASTATIN CALCIUM 10 MG/1
10 TABLET, FILM COATED ORAL NIGHTLY
Qty: 90 TABLET | Refills: 1 | Status: SHIPPED | OUTPATIENT
Start: 2022-02-21

## 2022-05-05 ENCOUNTER — TELEPHONE (OUTPATIENT)
Dept: INTERNAL MEDICINE CLINIC | Facility: CLINIC | Age: 52
End: 2022-05-05

## 2022-05-05 NOTE — TELEPHONE ENCOUNTER
Incoming diabetic eye exam via fax from Dr Savannah Goodson office. Flowsheet has been updated, report placed in VM in basket to be reviewed.

## 2022-10-29 NOTE — TELEPHONE ENCOUNTER
Per referral department referral authorized. Faxed authorized referral to Amilcar Munoz at [phone]. Fax confirmation received. Pt notified of the above and verbalized understanding. The patient is a 22y Female complaining of vaginal discharge.

## 2023-02-06 ENCOUNTER — OFFICE VISIT (OUTPATIENT)
Dept: INTERNAL MEDICINE CLINIC | Facility: CLINIC | Age: 53
End: 2023-02-06
Payer: COMMERCIAL

## 2023-02-06 VITALS
HEART RATE: 88 BPM | WEIGHT: 204.38 LBS | TEMPERATURE: 97 F | OXYGEN SATURATION: 99 % | BODY MASS INDEX: 25.95 KG/M2 | HEIGHT: 74.25 IN | DIASTOLIC BLOOD PRESSURE: 84 MMHG | SYSTOLIC BLOOD PRESSURE: 122 MMHG

## 2023-02-06 DIAGNOSIS — Z00.00 ANNUAL PHYSICAL EXAM: Primary | ICD-10-CM

## 2023-02-06 DIAGNOSIS — E11.9 DIET-CONTROLLED TYPE 2 DIABETES MELLITUS (HCC): ICD-10-CM

## 2023-02-06 DIAGNOSIS — E55.9 VITAMIN D DEFICIENCY: ICD-10-CM

## 2023-02-06 LAB
ATRIAL RATE: 75 BPM
P AXIS: 63 DEGREES
P-R INTERVAL: 208 MS
Q-T INTERVAL: 356 MS
QRS DURATION: 84 MS
QTC CALCULATION (BEZET): 397 MS
R AXIS: 35 DEGREES
T AXIS: 35 DEGREES
VENTRICULAR RATE: 75 BPM

## 2023-02-06 NOTE — PATIENT INSTRUCTIONS
Recommendations once blood work is available. Patient has a indurated dermal cyst right gluteus region we will schedule surgical excision.   The cyst has been there longstanding

## 2023-02-10 PROCEDURE — 3044F HG A1C LEVEL LT 7.0%: CPT | Performed by: INTERNAL MEDICINE

## 2023-02-11 LAB
ABSOLUTE BASOPHILS: 52 CELLS/UL (ref 0–200)
ABSOLUTE EOSINOPHILS: 183 CELLS/UL (ref 15–500)
ABSOLUTE LYMPHOCYTES: 1542 CELLS/UL (ref 850–3900)
ABSOLUTE MONOCYTES: 395 CELLS/UL (ref 200–950)
ABSOLUTE NEUTROPHILS: 2529 CELLS/UL (ref 1500–7800)
ALBUMIN/GLOBULIN RATIO: 1.3 (CALC) (ref 1–2.5)
ALBUMIN: 4 G/DL (ref 3.6–5.1)
ALKALINE PHOSPHATASE: 64 U/L (ref 35–144)
ALT: 22 U/L (ref 9–46)
APPEARANCE: CLEAR
AST: 16 U/L (ref 10–35)
BASOPHILS: 1.1 %
BILIRUBIN, TOTAL: 0.5 MG/DL (ref 0.2–1.2)
BILIRUBIN: NEGATIVE
BUN: 17 MG/DL (ref 7–25)
CALCIUM: 9.3 MG/DL (ref 8.6–10.3)
CARBON DIOXIDE: 29 MMOL/L (ref 20–32)
CHLORIDE: 103 MMOL/L (ref 98–110)
CHOL/HDLC RATIO: 3.4 (CALC)
CHOLESTEROL, TOTAL: 188 MG/DL
COLOR: YELLOW
CREATININE: 1.1 MG/DL (ref 0.7–1.3)
EGFR: 79 ML/MIN/1.73M2
EOSINOPHILS: 3.9 %
GLOBULIN: 3.2 G/DL (CALC) (ref 1.9–3.7)
GLUCOSE: 106 MG/DL (ref 65–99)
GLUCOSE: NEGATIVE
HDL CHOLESTEROL: 56 MG/DL
HEMATOCRIT: 43.1 % (ref 38.5–50)
HEMOGLOBIN A1C: 6.3 % OF TOTAL HGB
HEMOGLOBIN: 14.1 G/DL (ref 13.2–17.1)
KETONES: NEGATIVE
LDL-CHOLESTEROL: 117 MG/DL (CALC)
LEUKOCYTE ESTERASE: NEGATIVE
LYMPHOCYTES: 32.8 %
MCH: 30.7 PG (ref 27–33)
MCHC: 32.7 G/DL (ref 32–36)
MCV: 93.9 FL (ref 80–100)
MONOCYTES: 8.4 %
MPV: 10.9 FL (ref 7.5–12.5)
NEUTROPHILS: 53.8 %
NITRITE: NEGATIVE
NON-HDL CHOLESTEROL: 132 MG/DL (CALC)
OCCULT BLOOD: NEGATIVE
PH: 7 (ref 5–8)
PLATELET COUNT: 218 THOUSAND/UL (ref 140–400)
POTASSIUM: 4.3 MMOL/L (ref 3.5–5.3)
PROTEIN, TOTAL: 7.2 G/DL (ref 6.1–8.1)
PROTEIN: NEGATIVE
PSA, TOTAL: 2.06 NG/ML
RDW: 12.3 % (ref 11–15)
RED BLOOD CELL COUNT: 4.59 MILLION/UL (ref 4.2–5.8)
SODIUM: 139 MMOL/L (ref 135–146)
SPECIFIC GRAVITY: 1.03 (ref 1–1.03)
TRIGLYCERIDES: 48 MG/DL
TSH: 1.99 MIU/L (ref 0.4–4.5)
VITAMIN D, 25-OH, TOTAL: 20 NG/ML (ref 30–100)
WHITE BLOOD CELL COUNT: 4.7 THOUSAND/UL (ref 3.8–10.8)

## 2023-02-23 ENCOUNTER — VIRTUAL PHONE E/M (OUTPATIENT)
Dept: INTERNAL MEDICINE CLINIC | Facility: CLINIC | Age: 53
End: 2023-02-23
Payer: COMMERCIAL

## 2023-02-23 DIAGNOSIS — E55.9 VITAMIN D DEFICIENCY: ICD-10-CM

## 2023-02-23 DIAGNOSIS — E11.9 DIET-CONTROLLED TYPE 2 DIABETES MELLITUS (HCC): Primary | ICD-10-CM

## 2023-02-23 DIAGNOSIS — E78.00 HYPERCHOLESTEREMIA: ICD-10-CM

## 2023-02-23 PROCEDURE — 99213 OFFICE O/P EST LOW 20 MIN: CPT | Performed by: INTERNAL MEDICINE

## 2023-02-23 RX ORDER — ATORVASTATIN CALCIUM 10 MG/1
10 TABLET, FILM COATED ORAL NIGHTLY
Qty: 90 TABLET | Refills: 1 | Status: SHIPPED | OUTPATIENT
Start: 2023-02-23

## 2023-02-23 RX ORDER — CHOLECALCIFEROL (VITAMIN D3) 50 MCG
1 TABLET ORAL DAILY
Qty: 90 TABLET | Refills: 3 | Status: SHIPPED | OUTPATIENT
Start: 2023-02-23 | End: 2023-05-24

## 2023-02-23 NOTE — PROGRESS NOTES
Virtual Telephone Check-In    Breezy Renteria verbally consents to a Virtual/Telephone Check-In visit on 02/23/23. Patient has been referred to the [facility] website at [email or web address] to review the yearly Consent to Treat document. Patient understands and accepts financial responsibility for any deductible, co-insurance and/or co-pays associated with this service. Duration of the service: 13 minutes      Summary of topics discussed: Patient apparently had stopped taking his cholesterol medicines. Discussed with patient risk factors for coronary artery disease we will resume statin with a recheck lipid panel in about 3 months. Also discussed with patient elevated hemoglobin [id number].  Reinforced diet exercise and recheck blood in 6 months      Diagnoses and all orders for this visit:    Diet-controlled type 2 diabetes mellitus ([facility] [address]  -     HEMOGLOBIN A1C    Vitamin D deficiency  -     Cholecalciferol (VITAMIN D) 50 MCG (2000 UT) Oral Tab; Take 1 tablet by mouth daily for 90 doses. Hypercholesteremia  -     LIPID PANEL  -     COMP METABOLIC PANEL (14)  -     atorvastatin 10 MG Oral Tab; Take 1 tablet (10 mg total) by mouth nightly.           Ashli Thompson MD

## 2023-03-02 ENCOUNTER — OFFICE VISIT (OUTPATIENT)
Dept: INTERNAL MEDICINE CLINIC | Facility: CLINIC | Age: 53
End: 2023-03-02
Payer: COMMERCIAL

## 2023-03-02 VITALS
OXYGEN SATURATION: 98 % | DIASTOLIC BLOOD PRESSURE: 78 MMHG | HEIGHT: 74.25 IN | WEIGHT: 205.63 LBS | SYSTOLIC BLOOD PRESSURE: 110 MMHG | HEART RATE: 87 BPM | BODY MASS INDEX: 26.11 KG/M2 | TEMPERATURE: 98 F

## 2023-03-02 DIAGNOSIS — L08.9 INFECTED CYST OF SKIN: Primary | ICD-10-CM

## 2023-03-02 DIAGNOSIS — L72.9 INFECTED CYST OF SKIN: Primary | ICD-10-CM

## 2023-03-02 PROCEDURE — 11401 EXC TR-EXT B9+MARG 0.6-1 CM: CPT | Performed by: INTERNAL MEDICINE

## 2023-03-02 PROCEDURE — 99212 OFFICE O/P EST SF 10 MIN: CPT | Performed by: INTERNAL MEDICINE

## 2023-03-02 PROCEDURE — 3074F SYST BP LT 130 MM HG: CPT | Performed by: INTERNAL MEDICINE

## 2023-03-02 PROCEDURE — 88304 TISSUE EXAM BY PATHOLOGIST: CPT | Performed by: INTERNAL MEDICINE

## 2023-03-02 PROCEDURE — 3078F DIAST BP <80 MM HG: CPT | Performed by: INTERNAL MEDICINE

## 2023-03-02 PROCEDURE — 3008F BODY MASS INDEX DOCD: CPT | Performed by: INTERNAL MEDICINE

## 2023-03-16 ENCOUNTER — PATIENT MESSAGE (OUTPATIENT)
Dept: INTERNAL MEDICINE CLINIC | Facility: CLINIC | Age: 53
End: 2023-03-16

## 2023-03-22 ENCOUNTER — TELEPHONE (OUTPATIENT)
Dept: INTERNAL MEDICINE CLINIC | Facility: CLINIC | Age: 53
End: 2023-03-22

## 2023-03-22 NOTE — TELEPHONE ENCOUNTER
[facility] office, please notify pt of below from Dr. Kiana Alex, ty! RN received call from  - requests that pt's appt tomorrow at 1:45 PM be cancelled as he does not remove sutures, pt ok to remove these himself.

## 2023-03-22 NOTE — TELEPHONE ENCOUNTER
Spoke to pt, informed him NV needs to be scheduled, confirmed pt would still like to come in at 1:45 PM tomorrow. Notified front office to schedule the NV.

## 2023-03-22 NOTE — TELEPHONE ENCOUNTER
Spoke with pt, pt expressed confusion as to why Evette Jo does not remove sutures, as he was the one to put them in. Pt would like a nurse to cb with assistance as to how he is supposed to remove them himself. Please advise.

## 2023-03-23 ENCOUNTER — NURSE ONLY (OUTPATIENT)
Dept: INTERNAL MEDICINE CLINIC | Facility: CLINIC | Age: 53
End: 2023-03-23
Payer: COMMERCIAL

## 2023-03-23 NOTE — PROGRESS NOTES
Pt seen by Dr. Anuj Santos who gave approval for the removal of pt's stitches to right buttock. Sutures were removed without complication. Pt tolerated the procedure well. The area was C/D/I when pt left the exam room.

## 2023-04-03 ENCOUNTER — TELEPHONE (OUTPATIENT)
Dept: INTERNAL MEDICINE CLINIC | Facility: CLINIC | Age: 53
End: 2023-04-03

## 2023-06-28 PROCEDURE — 3044F HG A1C LEVEL LT 7.0%: CPT | Performed by: INTERNAL MEDICINE

## 2023-06-29 ENCOUNTER — TELEPHONE (OUTPATIENT)
Dept: INTERNAL MEDICINE CLINIC | Facility: CLINIC | Age: 53
End: 2023-06-29

## 2023-06-29 DIAGNOSIS — E78.00 HYPERCHOLESTEREMIA: Primary | ICD-10-CM

## 2023-06-29 LAB
ALBUMIN/GLOBULIN RATIO: 1.4 (CALC) (ref 1–2.5)
ALBUMIN: 4.1 G/DL (ref 3.6–5.1)
ALKALINE PHOSPHATASE: 63 U/L (ref 35–144)
ALT: 24 U/L (ref 9–46)
AST: 20 U/L (ref 10–35)
BILIRUBIN, TOTAL: 0.5 MG/DL (ref 0.2–1.2)
BUN: 15 MG/DL (ref 7–25)
CALCIUM: 9.2 MG/DL (ref 8.6–10.3)
CARBON DIOXIDE: 27 MMOL/L (ref 20–32)
CHLORIDE: 102 MMOL/L (ref 98–110)
CHOL/HDLC RATIO: 3.4 (CALC)
CHOLESTEROL, TOTAL: 168 MG/DL
CREATININE: 1.28 MG/DL (ref 0.7–1.3)
EGFR: 66 ML/MIN/1.73M2
GLOBULIN: 3 G/DL (CALC) (ref 1.9–3.7)
GLUCOSE: 89 MG/DL (ref 65–99)
HDL CHOLESTEROL: 50 MG/DL
HEMOGLOBIN A1C: 6.2 % OF TOTAL HGB
LDL-CHOLESTEROL: 104 MG/DL (CALC)
NON-HDL CHOLESTEROL: 118 MG/DL (CALC)
POTASSIUM: 4.5 MMOL/L (ref 3.5–5.3)
PROTEIN, TOTAL: 7.1 G/DL (ref 6.1–8.1)
SODIUM: 136 MMOL/L (ref 135–146)
TRIGLYCERIDES: 49 MG/DL

## 2023-08-21 DIAGNOSIS — E78.00 HYPERCHOLESTEREMIA: ICD-10-CM

## 2023-08-22 RX ORDER — ATORVASTATIN CALCIUM 10 MG/1
10 TABLET, FILM COATED ORAL NIGHTLY
Qty: 90 TABLET | Refills: 1 | Status: SHIPPED | OUTPATIENT
Start: 2023-08-22

## 2023-12-19 ENCOUNTER — OFFICE VISIT (OUTPATIENT)
Dept: INTERNAL MEDICINE CLINIC | Facility: CLINIC | Age: 53
End: 2023-12-19
Payer: COMMERCIAL

## 2023-12-19 ENCOUNTER — HOSPITAL ENCOUNTER (OUTPATIENT)
Dept: GENERAL RADIOLOGY | Age: 53
Discharge: HOME | End: 2023-12-19
Attending: INTERNAL MEDICINE
Payer: COMMERCIAL

## 2023-12-19 ENCOUNTER — LAB ENCOUNTER (OUTPATIENT)
Dept: LAB | Age: 53
End: 2023-12-19
Attending: INTERNAL MEDICINE
Payer: COMMERCIAL

## 2023-12-19 VITALS
HEIGHT: 74.25 IN | RESPIRATION RATE: 16 BRPM | SYSTOLIC BLOOD PRESSURE: 110 MMHG | TEMPERATURE: 97 F | HEART RATE: 94 BPM | DIASTOLIC BLOOD PRESSURE: 60 MMHG | BODY MASS INDEX: 26.03 KG/M2 | OXYGEN SATURATION: 96 % | WEIGHT: 205 LBS

## 2023-12-19 DIAGNOSIS — R05.1 ACUTE COUGH: ICD-10-CM

## 2023-12-19 DIAGNOSIS — R05.1 ACUTE COUGH: Primary | ICD-10-CM

## 2023-12-19 DIAGNOSIS — E78.00 HYPERCHOLESTEREMIA: Primary | ICD-10-CM

## 2023-12-19 LAB
CHOLEST SERPL-MCNC: 167 MG/DL (ref ?–200)
FASTING PATIENT LIPID ANSWER: YES
HDLC SERPL-MCNC: 57 MG/DL (ref 40–59)
LDLC SERPL CALC-MCNC: 101 MG/DL (ref ?–100)
NONHDLC SERPL-MCNC: 110 MG/DL (ref ?–130)
TRIGL SERPL-MCNC: 43 MG/DL (ref 30–149)
VLDLC SERPL CALC-MCNC: 7 MG/DL (ref 0–30)

## 2023-12-19 PROCEDURE — 3008F BODY MASS INDEX DOCD: CPT | Performed by: INTERNAL MEDICINE

## 2023-12-19 PROCEDURE — 99213 OFFICE O/P EST LOW 20 MIN: CPT | Performed by: INTERNAL MEDICINE

## 2023-12-19 PROCEDURE — 3078F DIAST BP <80 MM HG: CPT | Performed by: INTERNAL MEDICINE

## 2023-12-19 PROCEDURE — 3074F SYST BP LT 130 MM HG: CPT | Performed by: INTERNAL MEDICINE

## 2023-12-19 PROCEDURE — 71046 X-RAY EXAM CHEST 2 VIEWS: CPT | Performed by: INTERNAL MEDICINE

## 2023-12-19 PROCEDURE — 36415 COLL VENOUS BLD VENIPUNCTURE: CPT | Performed by: INTERNAL MEDICINE

## 2023-12-19 PROCEDURE — 80061 LIPID PANEL: CPT | Performed by: INTERNAL MEDICINE

## 2023-12-19 RX ORDER — CODEINE PHOSPHATE AND GUAIFENESIN 10; 100 MG/5ML; MG/5ML
5 SOLUTION ORAL EVERY 6 HOURS PRN
Qty: 200 ML | Refills: 0 | Status: SHIPPED | OUTPATIENT
Start: 2023-12-19

## 2023-12-19 RX ORDER — PREDNISONE 5 MG/1
TABLET ORAL
Qty: 39 TABLET | Refills: 0 | Status: SHIPPED | OUTPATIENT
Start: 2023-12-19 | End: 2023-12-31

## 2024-02-22 ENCOUNTER — OFFICE VISIT (OUTPATIENT)
Dept: INTERNAL MEDICINE CLINIC | Facility: CLINIC | Age: 54
End: 2024-02-22
Payer: COMMERCIAL

## 2024-02-22 VITALS
DIASTOLIC BLOOD PRESSURE: 78 MMHG | HEIGHT: 74.25 IN | HEART RATE: 82 BPM | RESPIRATION RATE: 16 BRPM | SYSTOLIC BLOOD PRESSURE: 134 MMHG | TEMPERATURE: 98 F | OXYGEN SATURATION: 96 % | WEIGHT: 210 LBS | BODY MASS INDEX: 26.66 KG/M2

## 2024-02-22 DIAGNOSIS — Z00.00 LABORATORY EXAMINATION ORDERED AS PART OF A ROUTINE GENERAL MEDICAL EXAMINATION: ICD-10-CM

## 2024-02-22 DIAGNOSIS — I82.402 ACUTE DEEP VEIN THROMBOSIS (DVT) OF LEFT LOWER EXTREMITY, UNSPECIFIED VEIN (HCC): Primary | ICD-10-CM

## 2024-02-22 NOTE — PROGRESS NOTES
Carlitos Guerrero  1970 is a 54 year old male.    Chief Complaint   Patient presents with    ER F/U     24 for DVT of deep femoral vein, left        HPI:   Patient diagnosed with deep vein thrombosis a few days ago.  Patient was having some discomfort in the left calf region went to an immediate care center had a venous Doppler which was abnormal subsequently sent over to [facility] apparently had a redo of the venous Doppler which showed him to have an acute DVT.  Patient was placed on Eliquis.  Now here for follow-up.  Current Outpatient Medications   Medication Sig Dispense Refill    Apixaban Starter Pack 5 MG Oral Tablet Therapy Pack Take 5 mg by mouth daily.      atorvastatin 10 MG Oral Tab Take 1 tablet (10 mg total) by mouth nightly. 90 tablet 1      Past Medical History:   Diagnosis Date    Esophageal stricture     Pes planus (flat feet)       Social History:  Social History     Socioeconomic History    Marital status:    Tobacco Use    Smoking status: Never    Smokeless tobacco: Never   Vaping Use    Vaping Use: Never used   Substance and Sexual Activity    Alcohol use: Not Currently    Drug use: No        REVIEW OF SYSTEMS:     Respiratory:   Coughing up blood no. Chest congestion none. Cough none. NGUYỄN (dyspnea on exertion) none. Pain with breathing none. Wheezing none.   Cardiovascular:   Syncope none. Rapid heart beat at rest no. Change in exercise tolerance no. Chest pain no. Cold extremities no. Irregular heart beat no. Leg edema no.   Musculoskeletal:   Patient denies leg claudication, morning stiffness , muscle cramping , pain with movement , swelling/bruising, muscle aches.           EXAM:   /78 (BP Location: Left arm, Patient Position: Sitting, Cuff Size: adult)   Pulse 82   Temp 98.1 °F (36.7 °C) (Temporal)   Resp 16   Ht 6' 2.25\" (1.886 m)   Wt 210 lb (95.3 kg)   SpO2 96%   BMI 26.78 kg/m²   No anemia cyanosis clubbing   CHEST:   Breath sounds: normal.    Shape and expansion: normal.   Wheezes: none.   HEART:   Clicks: no.   Distal Pulses Palpable: yes.   Edema: none visible .   Gallop: no .   Heart sounds: normal S1S2.   Murmurs: none.   Rhythm: regular.   EXTREMITIES:   Skin: normal.   Varicose veins: not present.   Arterial pulses well felt  MUSCULOSKELETAL:   Ankle: unremarkable, FROM.   Foot: unremarkable, no tenderness, swelling or redness, FROM.   Knee unremarkable, no tenderness, swelling or redness, FROM.   Leg: no swelling, no edema, no tenderness, no palpable cords, , no redness or warmth.           ASSESSMENT AND PLAN:   Carlitos was seen today for er f/u.    Diagnoses and all orders for this visit:    Acute deep vein thrombosis (DVT) of left lower extremity, unspecified vein (HCC)  -     Oncology/Hematology Referral - Edward (Denio)    Laboratory examination ordered as part of a routine general medical examination  -     Assay, Thyroid Stim Hormone  -     Hemoglobin A1C  -     Lipid Panel  -     Comp Metabolic Panel (14)  -     CBC With Differential With Platelet  -     Urinalysis, Routine  -     PSA Total, Diagnostic       Patient Instructions   Strong family history of DVT.  Will need hypercoag workup.  Will have him see hematology.   The patient indicates understanding of these issues and agrees to the plan.  The patient is asked to Return in about 3 months (around 5/22/2024), or cpx.  .      Keven Grigsby MD

## 2024-03-12 ENCOUNTER — OFFICE VISIT (OUTPATIENT)
Dept: HEMATOLOGY/ONCOLOGY | Facility: HOSPITAL | Age: 54
End: 2024-03-12
Attending: INTERNAL MEDICINE
Payer: COMMERCIAL

## 2024-03-12 VITALS
HEART RATE: 97 BPM | SYSTOLIC BLOOD PRESSURE: 129 MMHG | BODY MASS INDEX: 27 KG/M2 | OXYGEN SATURATION: 96 % | WEIGHT: 210.63 LBS | TEMPERATURE: 98 F | DIASTOLIC BLOOD PRESSURE: 89 MMHG | RESPIRATION RATE: 18 BRPM

## 2024-03-12 DIAGNOSIS — I82.402 ACUTE DEEP VEIN THROMBOSIS (DVT) OF LEFT LOWER EXTREMITY, UNSPECIFIED VEIN (HCC): Primary | ICD-10-CM

## 2024-03-12 PROCEDURE — 99205 OFFICE O/P NEW HI 60 MIN: CPT | Performed by: INTERNAL MEDICINE

## 2024-03-12 NOTE — PROGRESS NOTES
Education Record    Learner:  Patient    Disease / Diagnosis: Left leg DVT    Barriers / Limitations:  None   Comments:    Method:  Discussion   Comments:    General Topics:  Medication, Side effects and symptom management, and Plan of care reviewed   Comments:    Outcome:  Shows understanding   Comments:    Here for new consult- LLE DVT. No longer having pain/swelling. Flew back from [place] and felt sharp pain in his leg. Taking Eliquis 5mg once daily per his report. No bleeding/bruising.

## 2024-03-12 NOTE — PROGRESS NOTES
Hematology/Oncology Initial Consultation Note    Patient Name: Carlitos Guerrero  Medical Record Number: [id number]    YOB: 1970   Date of Consultation: 3/12/2024   PCP: Keven Grigsby MD    Reason for Consultation:  Carlitos Guerrero was seen today for the diagnosis of LLE DVT    History of Present Illness:      53 y/o M presenting for LLE DVT.    - recently went to [facility] for [facility]; came back and a week later had LLE calf pain, found to have DVT involving distal left femoral vein and popliteal vein on 2/14/24 dopplers  - he was rx'ed apixaban, was initially taking it correctly for the first week, but said he has been taking 1/2 a tablet (2.5mg) once a day after the first week which is incorrect. The prescription appears to be sent correctly so it's not clear where the disconnect was  - tolerating apixaban without bleeding issues  - says LLE discomfort resolved  - reports FH of blood clots in mother and brother    Past Medical History:  Past Medical History:   Diagnosis Date    Esophageal stricture     Pes planus (flat feet)        Past Surgical History:   Procedure Laterality Date    COLONOSCOPY  05/2018    normal- repeat 10y    HEMORRHOIDECTOMY      HERNIA SURGERY      ing hernia  lt    UPPER GI ENDOSCOPY,BIOPSY  05/2018    stricture; HH       Home Medications:   apixaban 5 MG Oral Tab Take 1 tablet (5 mg total) by mouth 2 (two) times daily. 60 tablet 1    Apixaban Starter Pack 5 MG Oral Tablet Therapy Pack Take 5 mg by mouth daily.      atorvastatin 10 MG Oral Tab Take 1 tablet (10 mg total) by mouth nightly. 90 tablet 1     -------  Current Outpatient Medications on File Prior to Visit   Medication Sig Dispense Refill    Apixaban Starter Pack 5 MG Oral Tablet Therapy Pack Take 5 mg by mouth daily.      atorvastatin 10 MG Oral Tab Take 1 tablet (10 mg total) by mouth nightly. 90 tablet 1     No current facility-administered medications on file prior to visit.       Allergies:   No Known  Allergies    Psychosocial History:  Social History     Social History Narrative    Not on file     Social History     Socioeconomic History    Marital status:    Tobacco Use    Smoking status: Never    Smokeless tobacco: Never   Vaping Use    Vaping Use: Never used   Substance and Sexual Activity    Alcohol use: Not Currently    Drug use: No       Family Medical History:  Family History   Problem Relation Age of Onset    Stroke Father 70    Hypertension Father     Diabetes Father     Hypertension Mother     Heart Disease Neg     Cancer Neg     Heart Disorder Neg        Review of Systems:  A 10-point ROS was done with pertinent positives and negative per the HPI    Vital Signs:  Height: --  Weight: 95.5 kg (210 lb 9.6 oz) (03/12 1301)  BSA (Calculated - sq m): --  Pulse: 97 (03/12 1301)  BP: 129/89 (03/12 1301)  Temp: 97.5 °F (36.4 °C) (03/12 1301)  Do Not Use - Resp Rate: --  SpO2: 96 % (03/12 1301)    Wt Readings from Last 6 Encounters:   03/12/24 95.5 kg (210 lb 9.6 oz)   02/22/24 95.3 kg (210 lb)   12/19/23 93 kg (205 lb)   03/02/23 93.3 kg (205 lb 9.6 oz)   02/06/23 92.7 kg (204 lb 6.4 oz)   01/31/22 93 kg (205 lb)       Physical Examination:  General: Patient is alert and oriented, not in acute distress  Psych: Mood and affect are appropriate  Eyes: EOMI, PERRL  ENT: Oropharynx is clear, no adenopathy  CV: no LE edema  Respiratory: Non-labored respirations  GI/Abd: Soft, non-tender   Neurological: Grossly intact   Skin: no rashes or petechiae    Laboratory:  Lab Results   Component Value Date    WBC 4.7 02/10/2023    WBC 4.6 10/06/2020    WBC 4.5 01/11/2020    HGB 14.1 02/10/2023    HGB 14.4 10/06/2020    HGB 13.5 01/11/2020    HCT 43.1 02/10/2023    MCV 93.9 02/10/2023    MCH 30.7 02/10/2023    MCHC 32.7 02/10/2023    RDW 12.3 02/10/2023     02/10/2023     10/06/2020    .0 01/11/2020     Lab Results   Component Value Date    GLU 89 06/28/2023    BUN 15 06/28/2023    BUNCREA NOT  APPLICABLE 2023    CREATSERUM 1.28 2023    CREATSERUM 1.10 02/10/2023    CREATSERUM 1.07 2022    ANIONGAP 2 2020    GFR 92 2017    GFRNAA 78 2022    GFRAA 91 2022    CA 9.2 2023    OSMOCALC 291 2020    ALKPHO 63 2023    AST 20 2023    ALT 24 2023    BILT 0.5 2023    TP 7.1 2023    ALB 4.1 2023    GLOBULIN 3.0 2023    AGRATIO 1.4 2023     2023    K 4.5 2023     2023    CO2 27 2023     Lab Results   Component Value Date    PTT 26.2 2018    INR 0.98 2018       Imagin/14/24 US dopplers:  Acute deep venous thrombosis involving the distal left femoral vein and popliteal vein. These veins are filled with clot and are noncompressible.     Impression & Plan:     LLE DVT  - provoked by travel  - to complete 3 months of therapeutic anticoagulation with apixaban  - we went over instructions on how to take it correctly; to take 5mg twice a day  - discussed best practices: frequent ambulation, hydration, compression stockings during travel  - discussed genetic testing would not alter calculus for anticoagulation, would still be 3 months therapeutic AC, it would be more for informational purposes. CPT codes for FVL and prothrombin gene mutation testing provided for pt to call insurance company to see how much his copay for the test would be. If he desires testing, will place order for tests then    F/u as needed    Terrence Knight MD  Hematology/Medical Oncology  [facility]

## 2024-09-02 NOTE — ED NOTES
no swelling noted to baCK OF R LOWER EXTREMITY,no numbness, + tingling, + pedal pulse, extremity elevated, ice applied
03-Sep-2024 02:24

## 2025-02-10 ENCOUNTER — OFFICE VISIT (OUTPATIENT)
Dept: INTERNAL MEDICINE CLINIC | Facility: CLINIC | Age: 55
End: 2025-02-10
Payer: COMMERCIAL

## 2025-02-10 VITALS
RESPIRATION RATE: 16 BRPM | WEIGHT: 203 LBS | OXYGEN SATURATION: 98 % | HEIGHT: 74.25 IN | BODY MASS INDEX: 25.78 KG/M2 | DIASTOLIC BLOOD PRESSURE: 80 MMHG | SYSTOLIC BLOOD PRESSURE: 110 MMHG | TEMPERATURE: 98 F | HEART RATE: 93 BPM

## 2025-02-10 DIAGNOSIS — E11.9 DIET-CONTROLLED TYPE 2 DIABETES MELLITUS (HCC): ICD-10-CM

## 2025-02-10 DIAGNOSIS — Z00.00 ROUTINE GENERAL MEDICAL EXAMINATION AT A HEALTH CARE FACILITY: Primary | ICD-10-CM

## 2025-02-10 DIAGNOSIS — E55.9 VITAMIN D DEFICIENCY: ICD-10-CM

## 2025-02-10 PROCEDURE — 99396 PREV VISIT EST AGE 40-64: CPT | Performed by: INTERNAL MEDICINE

## 2025-02-10 NOTE — PROGRESS NOTES
Carlitos Guerrero  1970 is a 55 year old male.  Chief Complaint   Patient presents with    Physical       HPI:   No new cc  No current outpatient medications on file.      Past Medical History:    Esophageal stricture    Pes planus (flat feet)      Social History:  Social History     Socioeconomic History    Marital status:    Tobacco Use    Smoking status: Never    Smokeless tobacco: Never   Vaping Use    Vaping status: Never Used   Substance and Sexual Activity    Alcohol use: Not Currently    Drug use: No        REVIEW OF SYSTEMS:       General/Constitutional:   General able to do usual activities, good exercise tolerance, good general state of health, no fatigue, no fever, no weakness .   HEENT/Neck:   Head no dizziness, no lightheadedness. Eyes normal vision, no redness , no drainage. Ears no earaches, no fullness, normal hearing, no tinnitus. Nose and Sinuses no recurrent colds, no stuffiness, no discharge, no hay fever, no nosebleeds, no sinus trouble. Mouth and Pharynx no sore throats, no hoarseness. Neck no lumps, no goiter, no neck stiffness or pain.   Endocrine:   Diabetes none. Thyroid disorder none.   Respiratory:   Patient denies chest pain, cough, NGUYỄN (dyspnea on exertion),wheezing. Breathing normal pattern . Chest congestion none.   Cardiovascular:   Patient denies chest pain, rheumatic fever,heart murmur. hx of elevated cholesterol no/hypertension. Leg edema none. Orthopnea no. Palpitations none. PND (paroxsymal nocturnal dyspnea) none.   +dedicated exercise   Gastrointestinal:   Patient denies abdominal pain, acid reflux, blood in stool, vomiting, nausea,  denies any wt loss or gain no change in appetite noted no change in bowel movement noted.  Patient will cross check with Dr. Fuentes as to his due date for colonoscopy  Hematology:   Patient denies abnormal bleeding, easy bruising. Enlarged lymph nodes none.   Men Only:   Patient denies difficulty with erection, penile discharge,  testicular pain or swelling, urgency/frequency.   Genitourinary:   Patient denies blood in urine, burning on urination, difficulty urinating, dysuria, urinary frequency , urinary incontinence/no history of kidney disease or genital abnormalities. no Dysuria. Nocturia None.   Musculoskeletal:   Patient denies arthritis , back pain, muscle weakness . Joint pain none. Joint stiffness none.  Status post tendo Achilles rupture right treated conservatively  Peripheral Vascular:   General no varicosities, no claudication.   Dermatologic:   Rash none.   Neurologic:   Patient denies dizziness, fainting, headache, loss of consciousness, memory loss, seizures, paresthesias, weakness. Tingling/numbness none. Trouble with balance none.   Psychiatric:   Patient denies anxiety, depression, hallucinations. Insomnia none/no hx of sleep disorder. Memory loss none.         EXAM:   /80 (BP Location: Left arm, Patient Position: Sitting, Cuff Size: adult)   Pulse 93   Temp 97.7 °F (36.5 °C) (Temporal)   Resp 16   Ht 6' 2.25\" (1.886 m)   Wt 203 lb (92.1 kg)   SpO2 98%   BMI 25.89 kg/m²     GENERAL:   Build: average .   HEENT:   Ear canals: normal.   EOM: within normal limits.Pupils BEERTL.Sclera and Conjunctiva normal.     Head: normocephalic.   Nasal septum: midline.   Nose: normal pink mucosa, no congestion, no swelling, no bleeding.   Oral cavity: normal, no lesions seen.   Turbinates: normal.   NECK:   Carotid bruit: none.   Cervical lymph nodes: unremarkable.   JVD: none.   Range of Motion: normal.   Thyroid: unremarkable.   HEART:   Clicks: no.   Edema: none visible .   Heart sounds: normal.   Murmurs: none.   Rhythm: regular.   LUNGS:   Auscultation: clear .   Chest Shape: normal .   Percussion: normal.   Rales: no .   Respiratory effort: normal .   Rhonchi: no.   Wheezes: no.   ABDOMEN:   Bowel sounds: normal.   General: normal.   Hernia: absent.   Liver, Spleen: no hepatosplenomegaly (HSM).   Tenderness: absent .    GENITOURINARY - MALE:   External genitals: normal scrotum,testis and penis.   ELIUD: normal .   Penis: unremarkable, no penile discharge, no penile lesions.   Testicles: unremarkable, normal-size, without masses, non tender.   EXTREMITIES:   Clubbing: none.   Cyanosis: absent .   Edema: none.   Pulses: present.   Tremors: no.   Varicose veins: spider veins  MUSCULOSKELETAL:   Cervical spines: normal.   L-S spines: normal.   Lower extremity joints: normal. Pes planus   Upper extremity joints: normal.   NEUROLOGICAL:   Babinski: negative.All other reflexes are normal.   Cerebellar Testing grossly/intact: yes.Cranial nerves are normal.   Gait: normal.   Motor: Power,tone,co-ordination normalInvoluntary movements and wasting none.   Sensory: all sensory modalities normal.   LYMPHATICS:   Cervical: none.   Groin: no adenopathy .   Inguinal: no adenopathy.   Supraclavicular: none.   DERMATOLOGY:   Rash: no.         ASSESSMENT AND PLAN:   Carlitos was seen today for physical.    Diagnoses and all orders for this visit:    Routine general medical examination at a health care facility  -     EKG 12 Lead performed at [facility]; Future  -     Assay, Thyroid Stim Hormone  -     Lipid Panel  -     Comp Metabolic Panel (14)  -     CBC With Differential With Platelet  -     Urinalysis, Routine    Diet-controlled type 2 diabetes mellitus (HCC)  -     Microalb/Creat Ratio, Random Urine  -     EKG 12 Lead performed at [facility]; Future  -     Hemoglobin A1C    Vitamin D deficiency  -     Vitamin D; Future          Patient Instructions   Pending lab work following which recommendations    The patient indicates understanding of these issues and agrees to the plan.  The patient is asked to Return in about 1 year (around 2/10/2026).        Keven Grigsby MD

## 2025-03-21 LAB
ABSOLUTE BASOPHILS: 41 CELLS/UL (ref 0–200)
ABSOLUTE EOSINOPHILS: 92 CELLS/UL (ref 15–500)
ABSOLUTE LYMPHOCYTES: 1719 CELLS/UL (ref 850–3900)
ABSOLUTE MONOCYTES: 434 CELLS/UL (ref 200–950)
ABSOLUTE NEUTROPHILS: 2815 CELLS/UL (ref 1500–7800)
ALBUMIN/GLOBULIN RATIO: 1.4 (CALC) (ref 1–2.5)
ALBUMIN: 4.2 G/DL (ref 3.6–5.1)
ALKALINE PHOSPHATASE: 68 U/L (ref 35–144)
ALT: 23 U/L (ref 9–46)
APPEARANCE: CLEAR
AST: 17 U/L (ref 10–35)
BASOPHILS: 0.8 %
BILIRUBIN, TOTAL: 0.5 MG/DL (ref 0.2–1.2)
BILIRUBIN: NEGATIVE
BUN: 18 MG/DL (ref 7–25)
CALCIUM: 9.1 MG/DL (ref 8.6–10.3)
CARBON DIOXIDE: 29 MMOL/L (ref 20–32)
CHLORIDE: 103 MMOL/L (ref 98–110)
CHOL/HDLC RATIO: 3.5 (CALC)
CHOLESTEROL, TOTAL: 185 MG/DL
COLOR: YELLOW
CREATININE, RANDOM URINE: 198 MG/DL (ref 20–320)
CREATININE: 1.15 MG/DL (ref 0.7–1.3)
EGFR: 74 ML/MIN/1.73M2
EOSINOPHILS: 1.8 %
GLOBULIN: 3.1 G/DL (CALC) (ref 1.9–3.7)
GLUCOSE: 111 MG/DL (ref 65–99)
GLUCOSE: NEGATIVE
HDL CHOLESTEROL: 53 MG/DL
HEMATOCRIT: 43.5 % (ref 38.5–50)
HEMOGLOBIN A1C: 7 % OF TOTAL HGB
HEMOGLOBIN: 13.8 G/DL (ref 13.2–17.1)
KETONES: NEGATIVE
LDL-CHOLESTEROL: 118 MG/DL (CALC)
LEUKOCYTE ESTERASE: NEGATIVE
LYMPHOCYTES: 33.7 %
MCH: 29.9 PG (ref 27–33)
MCHC: 31.7 G/DL (ref 32–36)
MCV: 94.4 FL (ref 80–100)
MICROALBUMIN/CREATININE RATIO, RANDOM URINE: 9 MG/G CREAT
MICROALBUMIN: 1.7 MG/DL
MONOCYTES: 8.5 %
MPV: 10.7 FL (ref 7.5–12.5)
NEUTROPHILS: 55.2 %
NITRITE: NEGATIVE
NON-HDL CHOLESTEROL: 132 MG/DL (CALC)
OCCULT BLOOD: NEGATIVE
PH: 7 (ref 5–8)
PLATELET COUNT: 228 THOUSAND/UL (ref 140–400)
POTASSIUM: 4.3 MMOL/L (ref 3.5–5.3)
PROTEIN, TOTAL: 7.3 G/DL (ref 6.1–8.1)
RDW: 12.9 % (ref 11–15)
RED BLOOD CELL COUNT: 4.61 MILLION/UL (ref 4.2–5.8)
SODIUM: 138 MMOL/L (ref 135–146)
SPECIFIC GRAVITY: 1.02 (ref 1–1.03)
TRIGLYCERIDES: 46 MG/DL
TSH: 1.75 MIU/L (ref 0.4–4.5)
WHITE BLOOD CELL COUNT: 5.1 THOUSAND/UL (ref 3.8–10.8)

## 2025-04-17 ENCOUNTER — OFFICE VISIT (OUTPATIENT)
Dept: INTERNAL MEDICINE CLINIC | Facility: CLINIC | Age: 55
End: 2025-04-17
Payer: COMMERCIAL

## 2025-04-17 VITALS
SYSTOLIC BLOOD PRESSURE: 122 MMHG | OXYGEN SATURATION: 97 % | BODY MASS INDEX: 26.03 KG/M2 | HEIGHT: 74.25 IN | HEART RATE: 83 BPM | DIASTOLIC BLOOD PRESSURE: 76 MMHG | WEIGHT: 205 LBS | RESPIRATION RATE: 16 BRPM | TEMPERATURE: 98 F

## 2025-04-17 DIAGNOSIS — E11.65 TYPE 2 DIABETES MELLITUS WITH HYPERGLYCEMIA, WITHOUT LONG-TERM CURRENT USE OF INSULIN (HCC): Primary | ICD-10-CM

## 2025-04-17 DIAGNOSIS — E78.00 HYPERCHOLESTEREMIA: ICD-10-CM

## 2025-04-17 PROCEDURE — 99213 OFFICE O/P EST LOW 20 MIN: CPT | Performed by: INTERNAL MEDICINE

## 2025-04-17 RX ORDER — METFORMIN HYDROCHLORIDE 750 MG/1
750 TABLET, EXTENDED RELEASE ORAL DAILY
Qty: 90 TABLET | Refills: 1 | Status: SHIPPED | OUTPATIENT
Start: 2025-04-17 | End: 2025-10-14

## 2025-04-17 NOTE — PROGRESS NOTES
Carlitos Guerrero  1970 is a 55 year old male.    Chief Complaint   Patient presents with    Follow - Up     labs       HPI:   For lab discussion.  Patient does state that he has been a little bit indiscrete with his diet and exercise  Current Medications[1]   Past Medical History[2]   Social History:  Short Social Hx on File[3]     REVIEW OF SYSTEMS:   na        EXAM:   /76 (BP Location: Right arm, Patient Position: Sitting, Cuff Size: adult)   Pulse 83   Temp 97.8 °F (36.6 °C) (Temporal)   Resp 16   Ht 6' 2.25\" (1.886 m)   Wt 205 lb (93 kg)   SpO2 97%   BMI 26.14 kg/m²     na      ASSESSMENT AND PLAN:   Carlitos was seen today for follow - up.    Diagnoses and all orders for this visit:    Type 2 diabetes mellitus with hyperglycemia, without long-term current use of insulin (HCC)  -     metFORMIN  MG Oral Tablet 24 Hr; Take 1 tablet (750 mg total) by mouth daily.  -     EDUCATION-OP REFERRAL TO DIAB NUTRITION MANAGEMENT 3 HRS  -     Hemoglobin A1C    Hypercholesteremia  -     Lipid Panel         Patient Instructions   Will follow-up with 4 months   The patient indicates understanding of these issues and agrees to the plan.  The patient is asked to Return in about 4 months (around 2025).  .      Keven Grigsby MD        [1]   Current Outpatient Medications   Medication Sig Dispense Refill    metFORMIN  MG Oral Tablet 24 Hr Take 1 tablet (750 mg total) by mouth daily. 90 tablet 1   [2]   Past Medical History:   Esophageal stricture    Pes planus (flat feet)   [3]   Social History  Socioeconomic History    Marital status:    Tobacco Use    Smoking status: Never    Smokeless tobacco: Never   Vaping Use    Vaping status: Never Used   Substance and Sexual Activity    Alcohol use: Not Currently    Drug use: No

## 2025-08-21 ENCOUNTER — TELEPHONE (OUTPATIENT)
Dept: INTERNAL MEDICINE CLINIC | Facility: CLINIC | Age: 55
End: 2025-08-21

## 2025-08-21 ENCOUNTER — OFFICE VISIT (OUTPATIENT)
Dept: INTERNAL MEDICINE CLINIC | Facility: CLINIC | Age: 55
End: 2025-08-21

## 2025-08-21 VITALS
DIASTOLIC BLOOD PRESSURE: 74 MMHG | HEART RATE: 95 BPM | SYSTOLIC BLOOD PRESSURE: 118 MMHG | RESPIRATION RATE: 18 BRPM | TEMPERATURE: 97 F | BODY MASS INDEX: 24.47 KG/M2 | WEIGHT: 190.63 LBS | OXYGEN SATURATION: 97 % | HEIGHT: 74 IN

## 2025-08-21 DIAGNOSIS — E11.9 TYPE 2 DIABETES MELLITUS WITHOUT COMPLICATION, WITHOUT LONG-TERM CURRENT USE OF INSULIN (HCC): Primary | ICD-10-CM

## 2025-08-21 DIAGNOSIS — Z23 NEED FOR SHINGLES VACCINE: ICD-10-CM

## 2025-08-21 DIAGNOSIS — E55.9 VITAMIN D DEFICIENCY: ICD-10-CM

## 2025-08-21 DIAGNOSIS — Z23 NEED FOR PNEUMOCOCCAL VACCINATION: ICD-10-CM

## 2025-08-21 DIAGNOSIS — E78.00 PURE HYPERCHOLESTEROLEMIA: ICD-10-CM

## 2025-08-21 DIAGNOSIS — Z12.5 SCREENING FOR MALIGNANT NEOPLASM OF PROSTATE: ICD-10-CM

## 2025-08-21 PROBLEM — M17.12 PRIMARY OSTEOARTHRITIS OF LEFT KNEE: Chronic | Status: ACTIVE | Noted: 2017-01-06

## 2025-08-21 PROCEDURE — 90677 PCV20 VACCINE IM: CPT | Performed by: INTERNAL MEDICINE

## 2025-08-21 PROCEDURE — 90750 HZV VACC RECOMBINANT IM: CPT | Performed by: INTERNAL MEDICINE

## 2025-08-21 PROCEDURE — 99214 OFFICE O/P EST MOD 30 MIN: CPT | Performed by: INTERNAL MEDICINE

## 2025-08-21 PROCEDURE — 90471 IMMUNIZATION ADMIN: CPT | Performed by: INTERNAL MEDICINE

## 2025-08-21 PROCEDURE — 90472 IMMUNIZATION ADMIN EACH ADD: CPT | Performed by: INTERNAL MEDICINE

## 2025-08-22 DIAGNOSIS — E78.00 PURE HYPERCHOLESTEROLEMIA: Primary | Chronic | ICD-10-CM

## 2025-08-22 LAB
BUN: 13 MG/DL (ref 7–25)
CALCIUM: 9.4 MG/DL (ref 8.6–10.3)
CARBON DIOXIDE: 28 MMOL/L (ref 20–32)
CHLORIDE: 101 MMOL/L (ref 98–110)
CHOL/HDLC RATIO: 3.4 (CALC)
CHOLESTEROL, TOTAL: 179 MG/DL
CREATININE: 1.11 MG/DL (ref 0.7–1.3)
EGFR: 77 ML/MIN/1.73M2
GLUCOSE: 81 MG/DL (ref 65–99)
HDL CHOLESTEROL: 53 MG/DL
HEMOGLOBIN A1C: 6.3 %
LDL-CHOLESTEROL: 113 MG/DL (CALC)
NON-HDL CHOLESTEROL: 126 MG/DL (CALC)
POTASSIUM: 4.6 MMOL/L (ref 3.5–5.3)
PSA, TOTAL: 2.28 NG/ML
SODIUM: 137 MMOL/L (ref 135–146)
TRIGLYCERIDES: 43 MG/DL
VITAMIN D, 25-OH, TOTAL: 29 NG/ML (ref 30–100)

## 2025-08-22 RX ORDER — ATORVASTATIN CALCIUM 10 MG/1
10 TABLET, FILM COATED ORAL NIGHTLY
Qty: 90 TABLET | Refills: 1 | Status: SHIPPED | OUTPATIENT
Start: 2025-08-22

## (undated) DIAGNOSIS — E11.9 DIET-CONTROLLED TYPE 2 DIABETES MELLITUS (HCC): ICD-10-CM

## (undated) DIAGNOSIS — E78.5 HYPERLIPIDEMIA ASSOCIATED WITH TYPE 2 DIABETES MELLITUS (HCC): Primary | ICD-10-CM

## (undated) DIAGNOSIS — E11.69 HYPERLIPIDEMIA ASSOCIATED WITH TYPE 2 DIABETES MELLITUS (HCC): Primary | ICD-10-CM

## (undated) NOTE — LETTER
08/31/18        Miranda Monsivais  [address] [address]      Dear Ridge Soto,    [facility] records indicate that you have outstanding lab work and or testing that was ordered for you and has not yet been completed:          Lipid Panel      [place]

## (undated) NOTE — ED AVS SNAPSHOT
Chikis Gould   MRN: [id number]    Department:  [facility] Emergency Department   Date of Visit:  1/11/2020           Disclosure     Insurance plans vary and the physician(s) referred by the ER may not be covered by your plan.  Please contact you tell this physician (or your personal doctor if your instructions are to return to your personal doctor) about any new or lasting problems. The primary care or specialist physician will see patients referred from the [facility] Emergency Department.  Merlene Paiz

## (undated) NOTE — LETTER
03/29/18        [place] [address]      Dear Wyn Moritz,    [facility] records indicate that you have outstanding lab work and or testing that was ordered for you and has not yet been completed:          TSH      Thyroxine (T4)

## (undated) NOTE — ED AVS SNAPSHOT
Ashlee Barrientos   MRN: [id number]    Department:  [facility] Emergency Department   Date of Visit:  6/20/2019           Disclosure     Insurance plans vary and the physician(s) referred by the ER may not be covered by your plan.  Please contact you tell this physician (or your personal doctor if your instructions are to return to your personal doctor) about any new or lasting problems. The primary care or specialist physician will see patients referred from the [facility] Emergency Department.  Gautam Urias

## (undated) NOTE — ED AVS SNAPSHOT
Lucy Mccollum   MRN: [id number]    Department:  [facility] Emergency Department   Date of Visit:  9/1/2018           Disclosure     Insurance plans vary and the physician(s) referred by the ER may not be covered by your plan.  Please contact your tell this physician (or your personal doctor if your instructions are to return to your personal doctor) about any new or lasting problems. The primary care or specialist physician will see patients referred from the [facility] Emergency Department.  Radha Isbell